# Patient Record
Sex: MALE | Race: WHITE | NOT HISPANIC OR LATINO | Employment: OTHER | URBAN - METROPOLITAN AREA
[De-identification: names, ages, dates, MRNs, and addresses within clinical notes are randomized per-mention and may not be internally consistent; named-entity substitution may affect disease eponyms.]

---

## 2018-02-08 ENCOUNTER — TELEPHONE (OUTPATIENT)
Dept: SURGERY | Facility: CLINIC | Age: 63
End: 2018-02-08

## 2018-02-08 DIAGNOSIS — Z80.0 FAMILY HISTORY OF CANCER OF GI TRACT: Primary | ICD-10-CM

## 2018-02-08 DIAGNOSIS — Z80.0 FAMILY HX OF COLON CANCER REQUIRING SCREENING COLONOSCOPY: Primary | ICD-10-CM

## 2018-02-08 NOTE — TELEPHONE ENCOUNTER
Spoke with patient in regards to ordering his colonoscopy.  I informed him that they will call him or he can call if he wants it done sooner.

## 2018-02-08 NOTE — TELEPHONE ENCOUNTER
----- Message from Kyara Mojica sent at 2/8/2018  9:29 AM CST -----  Contact: Self  Pt is calling because he received notice he needs to be scheduled for a Colonoscopy and would like to speak with Staff about it.    He can be reached at [phone].    Thank you.

## 2018-03-14 DIAGNOSIS — Z80.0 FAMILY HISTORY OF GI TRACT CANCER: Primary | ICD-10-CM

## 2018-03-14 RX ORDER — POLYETHYLENE GLYCOL 3350, SODIUM SULFATE ANHYDROUS, SODIUM BICARBONATE, SODIUM CHLORIDE, POTASSIUM CHLORIDE 236; 22.74; 6.74; 5.86; 2.97 G/4L; G/4L; G/4L; G/4L; G/4L
4 POWDER, FOR SOLUTION ORAL ONCE
Qty: 4000 ML | Refills: 0 | Status: SHIPPED | OUTPATIENT
Start: 2018-03-14 | End: 2018-03-14

## 2018-04-09 ENCOUNTER — HOSPITAL ENCOUNTER (OUTPATIENT)
Facility: HOSPITAL | Age: 63
Discharge: HOME | End: 2018-04-09
Attending: COLON & RECTAL SURGERY | Admitting: COLON & RECTAL SURGERY
Payer: COMMERCIAL

## 2018-04-09 ENCOUNTER — ANESTHESIA (OUTPATIENT)
Dept: ENDOSCOPY | Facility: HOSPITAL | Age: 63
End: 2018-04-09
Payer: COMMERCIAL

## 2018-04-09 ENCOUNTER — ANESTHESIA EVENT (OUTPATIENT)
Dept: ENDOSCOPY | Facility: HOSPITAL | Age: 63
End: 2018-04-09
Payer: COMMERCIAL

## 2018-04-09 VITALS
WEIGHT: 184 LBS | TEMPERATURE: 98 F | HEART RATE: 62 BPM | BODY MASS INDEX: 25.76 KG/M2 | OXYGEN SATURATION: 95 % | RESPIRATION RATE: 18 BRPM | SYSTOLIC BLOOD PRESSURE: 118 MMHG | HEIGHT: 71 IN | DIASTOLIC BLOOD PRESSURE: 75 MMHG

## 2018-04-09 DIAGNOSIS — Z12.11 SPECIAL SCREENING FOR MALIGNANT NEOPLASMS, COLON: ICD-10-CM

## 2018-04-09 PROCEDURE — 25000003 PHARM REV CODE 250: Performed by: COLON & RECTAL SURGERY

## 2018-04-09 PROCEDURE — S5010 5% DEXTROSE AND 0.45% SALINE: HCPCS | Performed by: COLON & RECTAL SURGERY

## 2018-04-09 PROCEDURE — 63600175 PHARM REV CODE 636 W HCPCS: Performed by: NURSE ANESTHETIST, CERTIFIED REGISTERED

## 2018-04-09 PROCEDURE — 37000008 HC ANESTHESIA 1ST 15 MINUTES: Performed by: COLON & RECTAL SURGERY

## 2018-04-09 PROCEDURE — D9220A PRA ANESTHESIA: Mod: 33,CRNA,, | Performed by: NURSE ANESTHETIST, CERTIFIED REGISTERED

## 2018-04-09 PROCEDURE — 45380 COLONOSCOPY AND BIOPSY: CPT | Performed by: COLON & RECTAL SURGERY

## 2018-04-09 PROCEDURE — 37000009 HC ANESTHESIA EA ADD 15 MINS: Performed by: COLON & RECTAL SURGERY

## 2018-04-09 PROCEDURE — 88305 TISSUE EXAM BY PATHOLOGIST: CPT

## 2018-04-09 PROCEDURE — 27201012 HC FORCEPS, HOT/COLD, DISP: Performed by: COLON & RECTAL SURGERY

## 2018-04-09 PROCEDURE — 45380 COLONOSCOPY AND BIOPSY: CPT | Mod: 33,,, | Performed by: COLON & RECTAL SURGERY

## 2018-04-09 PROCEDURE — D9220A PRA ANESTHESIA: Mod: 33,ANES,, | Performed by: ANESTHESIOLOGY

## 2018-04-09 PROCEDURE — 88305 TISSUE EXAM BY PATHOLOGIST: CPT | Mod: 26,,,

## 2018-04-09 RX ORDER — LIDOCAINE HCL/PF 100 MG/5ML
SYRINGE (ML) INTRAVENOUS
Status: DISCONTINUED | OUTPATIENT
Start: 2018-04-09 | End: 2018-04-09

## 2018-04-09 RX ORDER — DEXTROSE MONOHYDRATE AND SODIUM CHLORIDE 5; .45 G/100ML; G/100ML
INJECTION, SOLUTION INTRAVENOUS CONTINUOUS
Status: DISCONTINUED | OUTPATIENT
Start: 2018-04-09 | End: 2018-04-09 | Stop reason: HOSPADM

## 2018-04-09 RX ORDER — PROPOFOL 10 MG/ML
VIAL (ML) INTRAVENOUS
Status: DISCONTINUED | OUTPATIENT
Start: 2018-04-09 | End: 2018-04-09

## 2018-04-09 RX ADMIN — PROPOFOL 100 MG: 10 INJECTION, EMULSION INTRAVENOUS at 09:04

## 2018-04-09 RX ADMIN — PROPOFOL 50 MG: 10 INJECTION, EMULSION INTRAVENOUS at 10:04

## 2018-04-09 RX ADMIN — PROPOFOL 20 MG: 10 INJECTION, EMULSION INTRAVENOUS at 09:04

## 2018-04-09 RX ADMIN — PROPOFOL 30 MG: 10 INJECTION, EMULSION INTRAVENOUS at 10:04

## 2018-04-09 RX ADMIN — DEXTROSE AND SODIUM CHLORIDE: 5; .45 INJECTION, SOLUTION INTRAVENOUS at 09:04

## 2018-04-09 RX ADMIN — PROPOFOL 50 MG: 10 INJECTION, EMULSION INTRAVENOUS at 09:04

## 2018-04-09 RX ADMIN — PROPOFOL 30 MG: 10 INJECTION, EMULSION INTRAVENOUS at 09:04

## 2018-04-09 RX ADMIN — LIDOCAINE HYDROCHLORIDE 100 MG: 20 INJECTION, SOLUTION INTRAVENOUS at 09:04

## 2018-04-09 NOTE — ANESTHESIA POSTPROCEDURE EVALUATION
"Anesthesia Post Evaluation    Patient: Matthew Pisano    Procedure(s) Performed: Procedure(s) (LRB):  COLONOSCOPY (N/A)    Final Anesthesia Type: general  Patient location during evaluation: PACU  Patient participation: Yes- Able to Participate  Level of consciousness: awake and alert  Post-procedure vital signs: reviewed and stable  Pain management: adequate  Airway patency: patent  PONV status at discharge: No PONV  Anesthetic complications: no      Cardiovascular status: blood pressure returned to baseline  Respiratory status: unassisted, room air and spontaneous ventilation  Hydration status: euvolemic  Follow-up not needed.        Visit Vitals  /75   Pulse 62   Temp 36.8 °C (98.2 °F)   Resp 18   Ht 5' 11" (1.803 m)   Wt 83.5 kg (184 lb)   SpO2 95%   BMI 25.66 kg/m²       Pain/John Score: Pain Assessment Performed: Yes (4/9/2018 10:42 AM)  Presence of Pain: denies (4/9/2018 10:42 AM)  John Score: 10 (4/9/2018 10:42 AM)      "

## 2018-04-09 NOTE — ANESTHESIA PREPROCEDURE EVALUATION
04/09/2018     Matthew Pisano is a 62 y.o., male here for colonoscopy.    History reviewed. No pertinent surgical history.    History reviewed. No pertinent surgical history.      Anesthesia Evaluation    I have reviewed the Patient Summary Reports.     I have reviewed the Medications.     Review of Systems  Anesthesia Hx:  No problems with previous Anesthesia  History of prior surgery of interest to airway management or planning: Denies Family Hx of Anesthesia complications.   Denies Personal Hx of Anesthesia complications.   Cardiovascular:  Cardiovascular Normal Exercise tolerance: good  Denies MI.    Denies Angina.    Pulmonary:  Pulmonary Normal  Denies Asthma.  Denies Recent URI.    Neurological:  Neurology Normal        Physical Exam  General:  Well nourished    Airway/Jaw/Neck:  Airway Findings: Mouth Opening: Normal Tongue: Normal  General Airway Assessment: Adult  Mallampati: II  TM Distance: Normal, at least 6 cm      Dental:  Dental Findings: In tact   Chest/Lungs:  Chest/Lungs Findings: Normal Respiratory Rate     Heart/Vascular:  Heart Findings: Rate: Normal        Mental Status:  Mental Status Findings:  Alert and Oriented         Anesthesia Plan  Type of Anesthesia, risks & benefits discussed:  Anesthesia Type:  general  Patient's Preference:   Intra-op Monitoring Plan: standard ASA monitors  Intra-op Monitoring Plan Comments:   Post Op Pain Control Plan: multimodal analgesia, IV/PO Opioids PRN and per primary service following discharge from PACU  Post Op Pain Control Plan Comments:   Induction:   IV  Beta Blocker:  Patient is not currently on a Beta-Blocker (No further documentation required).       Informed Consent: Patient understands risks and agrees with Anesthesia plan.  Questions answered. Anesthesia consent signed with patient.  ASA Score: 1     Day of Surgery Review of History &  Physical:    H&P update referred to the surgeon.         Ready For Surgery From Anesthesia Perspective.

## 2018-04-09 NOTE — DISCHARGE INSTRUCTIONS
Colonoscopy     A camera attached to a flexible tube with a viewing lens is used to take video pictures.     Colonoscopy is a test to view the inside of your lower digestive tract (colon and rectum). Sometimes it can show the last part of the small intestine (ileum). During the test, small pieces of tissue may be removed for testing. This is called a biopsy. Small growths, such as polyps, may also be removed.   Why is colonoscopy done?  The test is done to help look for colon cancer. And it can help find the source of abdominal pain, bleeding, and changes in bowel habits. It may be needed once a year, depending on factors such as your:  · Age  · Health history  · Family health history  · Symptoms  · Results from any prior colonoscopy  Risks and possible complications  These include:  · Bleeding               · A puncture or tear in the colon   · Risks of anesthesia  · A cancer lesion not being seen  Getting ready   To prepare for the test:  · Talk with your healthcare provider about the risks of the test (see below). Also ask your healthcare provider about alternatives to the test.  · Tell your healthcare provider about any medicines you take. Also tell him or her about any health conditions you may have.  · Make sure your rectum and colon are empty for the test. Follow the diet and bowel prep instructions exactly. If you dont, the test may need to be rescheduled.  · Plan for a friend or family member to drive you home after the test.     Colonoscopy provides an inside view of the entire colon.     You may discuss the results with your doctor right away or at a future visit.  During the test   The test is usually done in the hospital on an outpatient basis. This means you go home the same day. The procedure takes about 30 minutes. During that time:  · You are given relaxing (sedating) medicine through an IV line. You may be drowsy, or fully asleep.  · The healthcare provider will first give you a physical exam to  check for anal and rectal problems.  · Then the anus is lubricated and the scope inserted.  · If you are awake, you may have a feeling similar to needing to have a bowel movement. You may also feel pressure as air is pumped into the colon. Its OK to pass gas during the procedure.  · Biopsy, polyp removal, or other treatments may be done during the test.  After the test   You may have gas right after the test. It can help to try to pass it to help prevent later bloating. Your healthcare provider may discuss the results with you right away. Or you may need to schedule a follow-up visit to talk about the results. After the test, you can go back to your normal eating and other activities. You may be tired from the sedation and need to rest for a few hours.  Date Last Reviewed: 11/1/2016 © 2000-2017 [employer]. [address] [place] [address], [place] [address]. All rights reserved. This information is not intended as a substitute for professional medical care. Always follow your healthcare professional's instructions.

## 2018-04-09 NOTE — PLAN OF CARE
Discharge instructions given to patient. Verbalized understanding, states I'm ready for discharge.

## 2018-04-09 NOTE — PROVATION PATIENT INSTRUCTIONS
Discharge Summary/Instructions after an Endoscopic Procedure  Patient Name: Matthew Pisano  Patient MRN: [id number]  Patient YOB: 1955 Monday, April 09, 2018  Tone Emmanuel MD  RESTRICTIONS:  During your procedure today, you received medications for sedation.  These   medications may affect your judgment, balance and coordination.  Therefore,   for 24 hours, you have the following restrictions:   - DO NOT drive a car, operate machinery, make legal/financial decisions,   sign important papers or drink alcohol.    ACTIVITY:  The following day: return to full activity including work, except no heavy   lifting, straining or running for 3 days if polyps were removed.  DIET:  Eat and drink normally unless instructed otherwise.     TREATMENT FOR COMMON SIDE EFFECTS:  - Mild abdominal pain, nausea, belching, bloating or excessive gas:  rest,   eat lightly and use a heating pad.  - Sore Throat: treat with throat lozenges and/or gargle with warm salt   water.  - Because air was used during the procedure, expelling large amounts of air   from your rectum or belching is normal.  - If a bowel prep was taken, you may not have a bowel movement for 1-3 days.    This is normal.  SYMPTOMS TO WATCH FOR AND REPORT TO YOUR PHYSICIAN:  1. Abdominal pain or bloating, other than gas cramps.  2. Chest pain.  3. Back pain.  4. Signs of infection such as: chills or fever occurring within 24 hours   after the procedure.  5. Rectal bleeding, which would show as bright red, maroon, or black stools.   (A tablespoon of blood from the rectum is not serious, especially if   hemorrhoids are present.)  6. Vomiting.  7. Weakness or dizziness.  GO DIRECTLY TO THE NEAREST EMERGENCY ROOM IF YOU HAVE ANY OF THE FOLLOWING:      Difficulty breathing              Chills and/or fever over 101 F   Persistent vomiting and/or vomiting blood   Severe abdominal pain   Severe chest pain   Black, tarry stools   Bleeding- more than one tablespoon   Any  other symptom or condition that you feel may need urgent attention  Your doctor recommends these additional instructions:  If any biopsies were taken, your doctors clinic will contact you in 1 to 2   weeks with any results.  - Discharge patient to home.   - Repeat colonoscopy in 5-10 years for surveillance based on pathology   results.  For questions, problems or results please call your physician - Tone Emmanuel MD at Work:  [phone].  [facility], EMERGENCY ROOM PHONE NUMBER: [phone]  IF A COMPLICATION OR EMERGENCY SITUATION ARISES AND YOU ARE UNABLE TO REACH   YOUR PHYSICIAN - GO DIRECTLY TO THE EMERGENCY ROOM.  Tone Emmanuel MD  4/9/2018 10:08:55 AM  This report has been verified and signed electronically.

## 2018-04-09 NOTE — TRANSFER OF CARE
"Anesthesia Transfer of Care Note    Patient: Matthew Pisano    Procedure(s) Performed: Procedure(s) (LRB):  COLONOSCOPY (N/A)    Patient location: PACU    Anesthesia Type: general    Transport from OR: Transported from OR on room air with adequate spontaneous ventilation    Post pain: adequate analgesia    Post assessment: no apparent anesthetic complications and tolerated procedure well    Post vital signs: stable    Level of consciousness: sedated    Nausea/Vomiting: no nausea/vomiting    Complications: none    Transfer of care protocol was followed      Last vitals:   Visit Vitals  BP (!) 145/75 (BP Location: Left arm, Patient Position: Lying)   Pulse (!) 59   Temp 36.9 °C (98.4 °F) (Temporal)   Resp 14   Ht 5' 11" (1.803 m)   Wt 83.5 kg (184 lb)   SpO2 98%   BMI 25.66 kg/m²     "

## 2018-04-09 NOTE — H&P
Endoscopy H&P    Procedure : Colonoscopy      asymptomatic screening exam and family history of colon cancer      History reviewed. No pertinent past medical history.  Sedation Problems: NO  Family History   Problem Relation Age of Onset    Bladder Cancer Father     Pancreatic cancer Brother     Melanoma Neg Hx     Psoriasis Neg Hx     Lupus Neg Hx      Fam Hx of Sedation Problems: NO  Social History     Social History    Marital status: Single     Spouse name: N/A    Number of children: N/A    Years of education: N/A     Occupational History    Restaurant Self     Social History Main Topics    Smoking status: Never Smoker    Smokeless tobacco: Never Used    Alcohol use Yes      Comment: Social    Drug use: No    Sexual activity: Not on file     Other Topics Concern    Not on file     Social History Narrative    No narrative on file       Review of Systems - Negative   Respiratory ROS: no cough, shortness of breath, or wheezing  Cardiovascular ROS: no chest pain or dyspnea on exertion  Gastrointestinal ROS: no abdominal pain, change in bowel habits, or black or bloody stools  Musculoskeletal ROS: negative  Neurological ROS: no TIA or stroke symptoms        Physical Exam:  General: no distress  Head: normocephalic  Airway:  normal oropharynx, airway normal  Neck: supple, symmetrical, trachea midline  Lungs:  clear to auscultation bilaterally and normal respiratory effort  Heart: regular rate and rhythm, S1, S2 normal, no murmur, rub or gallop  Abdomen: soft, non-tender non-distented; bowel sounds normal; no masses,  no organomegaly  Extremities: no cyanosis or edema, or clubbing       Deep Sedation: Mallampati Score per anesthesia     SedationPlan :Choice     ASA : II

## 2018-04-16 ENCOUNTER — TELEPHONE (OUTPATIENT)
Dept: ENDOSCOPY | Facility: HOSPITAL | Age: 63
End: 2018-04-16

## 2019-06-09 ENCOUNTER — OFFICE VISIT (OUTPATIENT)
Dept: URGENT CARE | Facility: CLINIC | Age: 64
End: 2019-06-09
Payer: COMMERCIAL

## 2019-06-09 VITALS
TEMPERATURE: 97 F | OXYGEN SATURATION: 99 % | SYSTOLIC BLOOD PRESSURE: 131 MMHG | HEIGHT: 71 IN | DIASTOLIC BLOOD PRESSURE: 81 MMHG | HEART RATE: 56 BPM | WEIGHT: 184 LBS | BODY MASS INDEX: 25.76 KG/M2

## 2019-06-09 DIAGNOSIS — R42 VERTIGO: ICD-10-CM

## 2019-06-09 DIAGNOSIS — R42 DIZZINESS: Primary | ICD-10-CM

## 2019-06-09 LAB
GLUCOSE SERPL-MCNC: 95 MG/DL (ref 70–110)
POC ANION GAP: 17 MMOL/L (ref 10–20)
POC BUN: 19 MMOL/L (ref 8–26)
POC CHLORIDE: 103 MMOL/L (ref 98–109)
POC CREATININE: 0.9 MG/DL (ref 0.6–1.3)
POC HEMATOCRIT: 46 %PCV (ref 42–52)
POC HEMOGLOBIN: 15.6 G/DL (ref 13.5–18)
POC ICA: 1.2 MMOL/L (ref 1.12–1.32)
POC POTASSIUM: 4.1 MMOL/L (ref 3.5–4.9)
POC SODIUM: 143 MMOL/L (ref 138–146)
POC TCO2: 28 MMOL/L (ref 24–29)

## 2019-06-09 PROCEDURE — 80047 POCT CHEMISTRY PANEL: ICD-10-PCS | Mod: QW,S$GLB,, | Performed by: PHYSICIAN ASSISTANT

## 2019-06-09 PROCEDURE — 3075F PR MOST RECENT SYSTOLIC BLOOD PRESS GE 130-139MM HG: ICD-10-PCS | Mod: CPTII,S$GLB,, | Performed by: PHYSICIAN ASSISTANT

## 2019-06-09 PROCEDURE — 99203 OFFICE O/P NEW LOW 30 MIN: CPT | Mod: S$GLB,,, | Performed by: PHYSICIAN ASSISTANT

## 2019-06-09 PROCEDURE — 99203 PR OFFICE/OUTPT VISIT, NEW, LEVL III, 30-44 MIN: ICD-10-PCS | Mod: S$GLB,,, | Performed by: PHYSICIAN ASSISTANT

## 2019-06-09 PROCEDURE — 3079F PR MOST RECENT DIASTOLIC BLOOD PRESSURE 80-89 MM HG: ICD-10-PCS | Mod: CPTII,S$GLB,, | Performed by: PHYSICIAN ASSISTANT

## 2019-06-09 PROCEDURE — 80047 BASIC METABLC PNL IONIZED CA: CPT | Mod: QW,S$GLB,, | Performed by: PHYSICIAN ASSISTANT

## 2019-06-09 PROCEDURE — 3008F BODY MASS INDEX DOCD: CPT | Mod: CPTII,S$GLB,, | Performed by: PHYSICIAN ASSISTANT

## 2019-06-09 PROCEDURE — 3079F DIAST BP 80-89 MM HG: CPT | Mod: CPTII,S$GLB,, | Performed by: PHYSICIAN ASSISTANT

## 2019-06-09 PROCEDURE — 3075F SYST BP GE 130 - 139MM HG: CPT | Mod: CPTII,S$GLB,, | Performed by: PHYSICIAN ASSISTANT

## 2019-06-09 PROCEDURE — 3008F PR BODY MASS INDEX (BMI) DOCUMENTED: ICD-10-PCS | Mod: CPTII,S$GLB,, | Performed by: PHYSICIAN ASSISTANT

## 2019-06-09 RX ORDER — MECLIZINE HYDROCHLORIDE 25 MG/1
25 TABLET ORAL 3 TIMES DAILY PRN
Qty: 20 TABLET | Refills: 0 | Status: SHIPPED | OUTPATIENT
Start: 2019-06-09 | End: 2021-01-26

## 2019-06-09 RX ORDER — ONDANSETRON 4 MG/1
4 TABLET, FILM COATED ORAL EVERY 8 HOURS PRN
Qty: 30 TABLET | Refills: 0 | Status: SHIPPED | OUTPATIENT
Start: 2019-06-09 | End: 2021-01-26

## 2019-06-09 RX ORDER — MECLIZINE HYDROCHLORIDE 25 MG/1
25 TABLET ORAL
Status: COMPLETED | OUTPATIENT
Start: 2019-06-09 | End: 2019-06-09

## 2019-06-09 RX ADMIN — MECLIZINE HYDROCHLORIDE 25 MG: 25 TABLET ORAL at 12:06

## 2019-06-09 NOTE — PROGRESS NOTES
"Subjective:       Patient ID: Matthew Pisano is a 63 y.o. male.    Vitals:  height is 5' 11" (1.803 m) and weight is 83.5 kg (184 lb). His oral temperature is 96.8 °F (36 °C). His blood pressure is 131/81 and his pulse is 56 (abnormal). His oxygen saturation is 99%.     Chief Complaint: No chief complaint on file.    Pt is 62 y/o male who presents with dizziness. Symptoms began yesterday. Pt reports feeling "hungover" all day. He feels like the room is spinning. Symptoms are fairly constant. He also has associated nausea. He has had vertigo in the past. He does report symptoms sometimes get worse with positional changes. He denies cp/sob, headache, numbness/tingling, weakness, vision changes, or trauma.     Dizziness:   Chronicity:  New  Onset:  Yesterday  Progression since onset:  Unchanged  Frequency:  Constantly  Pain Scale:  4/10  Severity:  Moderate  Duration:  Off/on all day  Dizziness characteristics:  Off-balance, spacial disorientation and trouble focusing eyes  Frequency of Spells:  Hourly   Associated symptoms: nausea and light-headedness.no fever, no headaches, no vomiting and no chest pain.  Aggravated by:  Nothing  Treatments tried:  Nothing      Constitution: Negative for chills, fatigue, fever and generalized weakness.   HENT: Negative for congestion and sore throat.    Neck: Negative for painful lymph nodes.   Cardiovascular: Negative for chest pain and leg swelling.   Eyes: Negative for double vision and blurred vision.   Respiratory: Negative for cough and shortness of breath.    Gastrointestinal: Positive for nausea. Negative for vomiting and diarrhea.   Genitourinary: Negative for dysuria, frequency and urgency.   Musculoskeletal: Negative for joint pain, joint swelling, muscle cramps and muscle ache.   Skin: Negative for color change, pale and rash.   Allergic/Immunologic: Negative for seasonal allergies.   Neurological: Positive for dizziness, history of vertigo and light-headedness. Negative " for passing out, loss of balance, headaches, numbness and tingling.   Hematologic/Lymphatic: Negative for swollen lymph nodes, easy bruising/bleeding and history of blood clots. Does not bruise/bleed easily.   Psychiatric/Behavioral: Negative for nervous/anxious, sleep disturbance and depression. The patient is not nervous/anxious.        Objective:      Physical Exam   Constitutional: He is oriented to person, place, and time. He appears well-developed and well-nourished. He is cooperative.  Non-toxic appearance. He does not appear ill. No distress.   HENT:   Head: Normocephalic and atraumatic.   Right Ear: Hearing, tympanic membrane, external ear and ear canal normal.   Left Ear: Hearing, tympanic membrane, external ear and ear canal normal.   Nose: Nose normal. No mucosal edema, rhinorrhea or nasal deformity. No epistaxis. Right sinus exhibits no maxillary sinus tenderness and no frontal sinus tenderness. Left sinus exhibits no maxillary sinus tenderness and no frontal sinus tenderness.   Mouth/Throat: Uvula is midline, oropharynx is clear and moist and mucous membranes are normal. No trismus in the jaw. Normal dentition. No uvula swelling. No posterior oropharyngeal erythema.   Eyes: Conjunctivae and lids are normal. No scleral icterus.   Sclera clear bilat   Neck: Trachea normal, full passive range of motion without pain and phonation normal. Neck supple.   Cardiovascular: Normal rate, regular rhythm, normal heart sounds, intact distal pulses and normal pulses.   Pulmonary/Chest: Effort normal and breath sounds normal. No respiratory distress.   Abdominal: Soft. Normal appearance and bowel sounds are normal. He exhibits no distension. There is no tenderness.   Musculoskeletal: Normal range of motion. He exhibits no edema or deformity.   Neurological: He is alert and oriented to person, place, and time. He has normal strength. No cranial nerve deficit or sensory deficit. He exhibits normal muscle tone.  Coordination and gait normal. GCS eye subscore is 4. GCS verbal subscore is 5. GCS motor subscore is 6.   No pronator drift or dysmetria. + Romberg.   Skin: Skin is warm, dry and intact. He is not diaphoretic. No pallor.   Psychiatric: He has a normal mood and affect. His speech is normal and behavior is normal. Judgment and thought content normal. Cognition and memory are normal.   Nursing note and vitals reviewed.      Results for orders placed or performed in visit on 06/09/19   POCT Chemistry Panel   Result Value Ref Range    POC Sodium 143 138 - 146 MMOL/L    POC Potassium 4.1 3.5 - 4.9 MMOL/L    POC Chloride 103 98 - 109 MMOL/L    POC BUN 19 8 - 26 MMOL/L    POC Glucose 95 70 - 110 MG/DL    POC Creatinine 0.9 0.6 - 1.3 mg/dL    POC iCA 1.20 1.12 - 1.32 MMOL/L    POC TCO2 28 24 - 29 MMOL/L    POC Hematocrit 46 42 - 52 %PCV    POC Hemoglobin 15.6 13.5 - 18 g/dL    POC Anion Gap 17 10.0 - 20 MMOL/L       Assessment:       1. Dizziness    2. Vertigo        Plan:         Dizziness  -     POCT Chemistry Panel  -     meclizine (ANTIVERT) 25 mg tablet; Take 1 tablet (25 mg total) by mouth 3 (three) times daily as needed for Dizziness.  Dispense: 20 tablet; Refill: 0  -     meclizine tablet 25 mg  -     ondansetron (ZOFRAN) 4 MG tablet; Take 1 tablet (4 mg total) by mouth every 8 (eight) hours as needed for Nausea.  Dispense: 30 tablet; Refill: 0    Vertigo  -     meclizine (ANTIVERT) 25 mg tablet; Take 1 tablet (25 mg total) by mouth 3 (three) times daily as needed for Dizziness.  Dispense: 20 tablet; Refill: 0  -     meclizine tablet 25 mg  -     ondansetron (ZOFRAN) 4 MG tablet; Take 1 tablet (4 mg total) by mouth every 8 (eight) hours as needed for Nausea.  Dispense: 30 tablet; Refill: 0          Orthostatic Low Blood Pressure (Hypotension)  A blood pressure reading is made up of 2 numbers There is a top number over a bottom number. The top number is the systolic pressure. The bottom number is the diastolic  pressure. A normal blood pressure is a systolic pressure less than 120 over a diastolic pressure less than 80. Low blood pressure (hypotension) is a blood pressure that is less than what is normal for you.  Orthostatic hypotension is a type of low blood pressure that occurs only when you change position from lying to standing. It can cause dizziness, lightheadedness, or fainting.  Some medicines can cause orthostatic hypotension. These include:  · High blood pressure medicines  · Water pills (diuretics)  · Some heart medicines  · Some antidepressants  · Pain, anxiety, sedative, and sleeping medicines  Other causes include:  · Dehydration from vomiting, diarrhea, or not getting enough fluids  · Severe infection  · High fever  · Blood loss, such as bleeding from the stomach or intestines  · Neurological diseases that impair the autonomic nervous system  Treatment will depend on what is causing your low blood pressure.  Home care  Follow these guidelines when caring for yourself at home:  · Rest until your symptoms get better.  · Change positions slowly from lying to standing. When getting out of bed, sit on the side of the bed with your legs down for at least 30 seconds before standing. This gives your body time to adjust to the position change.  · Follow the treatment plan described by your healthcare provider.  Follow-up care  Follow up with your healthcare provider, or as advised.  When to seek medical advice  Call your healthcare provider right away if any of these occur:  · Dizziness, lightheadedness, or fainting  · Black or red color in your stools or vomit  · Diarrhea or vomiting that doesnt go away  · You arent able to eat or drink  · Fever of 100.4°F (38°C) or higher, or as directed by your healthcare provider  · Burning when you urinate  · Foul-smelling urine  Date Last Reviewed: 12/1/2016  © 6104-4320 [employer]. [address] [place] [address], [place] [address]. All rights reserved. This  information is not intended as a substitute for professional medical care. Always follow your healthcare professional's instructions.        Vertigo (Unknown Cause)    In addition to helping with hearing, the inner ear is part of the balance center of your body. Problems with the inner ear can a false feeling of motion. This is called vertigo. Often, it feels as if you or the room is spinning. A vertigo attack may cause sudden nausea, vomiting and heavy sweating. Severe vertigo causes a loss of balance and can cause you to fall. During vertigo, small head movements and changes in body position will often make the symptoms worse. You may also have ringing in the ears called tinnitus.  An episode of vertigo may last seconds, minutes or hours. Once you are over the first episode, it may never come back. However, symptoms may return off and on.  The cause of your vertigo is not yet known. Possible causes of vertigo include:  · Inflammation of the inner ear  · Disease of the nerves to the inner ear  · Movement of calcium particles in the inner ear  · Poor blood flow to the balance centers of the brain  · Migraine headaches  Home care  · If symptoms are severe, rest quietly in bed. Change positions very slowly. There is usually one position that will feel best, such as lying on one side or lying on your back with your head slightly raised on pillows.  · Do not drive a car or work with dangerous machinery until symptoms have been gone for at least one week.  · Take medicine as prescribed to relieve your symptoms. Unless another medicine was prescribed for symptoms of nausea, vomiting, and dizziness, you may use over-the-counter motion sickness pills. Ask your pharmacist for suggestions.  Follow-up care  Follow up with your healthcare provider or as directed. If you are referred to a specialist or for testing, make the appointment promptly.  When to seek medical advice  Call your healthcare provider if any of the following  occur:  · Fever of 100.4°F (38ºC) or higher, or as directed by your healthcare provider  · Vertigo worsens or is not controlled by prescribed medicine   · Repeated vomiting not relieved by prescribed medicine   · Severe headache  · Confusion  · Weakness of an arm or leg or one side of the face  · Difficulty with speech or vision  · Loss of consciousness   · Seizure  Date Last Reviewed: 8/16/2015  © 0181-5694 [employer]. [address] [place] [address], [place] [address]. All rights reserved. This information is not intended as a substitute for professional medical care. Always follow your healthcare professional's instructions.      Please follow up with your Primary care provider within 2-5 days if your signs and symptoms have not resolved or worsen.     If your condition worsens or fails to improve we recommend that you receive another evaluation at the emergency room immediately or contact your primary medical clinic to discuss your concerns.   You must understand that you have received an Urgent Care treatment only and that you may be released before all of your medical problems are known or treated. You, the patient, will arrange for follow up care as instructed.     RED FLAGS/WARNING SYMPTOMS DISCUSSED WITH PATIENT THAT WOULD WARRANT EMERGENT MEDICAL ATTENTION. PATIENT VERBALIZED UNDERSTANDING.

## 2019-06-09 NOTE — PATIENT INSTRUCTIONS
Orthostatic Low Blood Pressure (Hypotension)  A blood pressure reading is made up of 2 numbers There is a top number over a bottom number. The top number is the systolic pressure. The bottom number is the diastolic pressure. A normal blood pressure is a systolic pressure less than 120 over a diastolic pressure less than 80. Low blood pressure (hypotension) is a blood pressure that is less than what is normal for you.  Orthostatic hypotension is a type of low blood pressure that occurs only when you change position from lying to standing. It can cause dizziness, lightheadedness, or fainting.  Some medicines can cause orthostatic hypotension. These include:  · High blood pressure medicines  · Water pills (diuretics)  · Some heart medicines  · Some antidepressants  · Pain, anxiety, sedative, and sleeping medicines  Other causes include:  · Dehydration from vomiting, diarrhea, or not getting enough fluids  · Severe infection  · High fever  · Blood loss, such as bleeding from the stomach or intestines  · Neurological diseases that impair the autonomic nervous system  Treatment will depend on what is causing your low blood pressure.  Home care  Follow these guidelines when caring for yourself at home:  · Rest until your symptoms get better.  · Change positions slowly from lying to standing. When getting out of bed, sit on the side of the bed with your legs down for at least 30 seconds before standing. This gives your body time to adjust to the position change.  · Follow the treatment plan described by your healthcare provider.  Follow-up care  Follow up with your healthcare provider, or as advised.  When to seek medical advice  Call your healthcare provider right away if any of these occur:  · Dizziness, lightheadedness, or fainting  · Black or red color in your stools or vomit  · Diarrhea or vomiting that doesnt go away  · You arent able to eat or drink  · Fever of 100.4°F (38°C) or higher, or as directed by your  healthcare provider  · Burning when you urinate  · Foul-smelling urine  Date Last Reviewed: 12/1/2016  © 0072-7855 [employer]. [address] [place] [address], [place] [address]. All rights reserved. This information is not intended as a substitute for professional medical care. Always follow your healthcare professional's instructions.        Vertigo (Unknown Cause)    In addition to helping with hearing, the inner ear is part of the balance center of your body. Problems with the inner ear can a false feeling of motion. This is called vertigo. Often, it feels as if you or the room is spinning. A vertigo attack may cause sudden nausea, vomiting and heavy sweating. Severe vertigo causes a loss of balance and can cause you to fall. During vertigo, small head movements and changes in body position will often make the symptoms worse. You may also have ringing in the ears called tinnitus.  An episode of vertigo may last seconds, minutes or hours. Once you are over the first episode, it may never come back. However, symptoms may return off and on.  The cause of your vertigo is not yet known. Possible causes of vertigo include:  · Inflammation of the inner ear  · Disease of the nerves to the inner ear  · Movement of calcium particles in the inner ear  · Poor blood flow to the balance centers of the brain  · Migraine headaches  Home care  · If symptoms are severe, rest quietly in bed. Change positions very slowly. There is usually one position that will feel best, such as lying on one side or lying on your back with your head slightly raised on pillows.  · Do not drive a car or work with dangerous machinery until symptoms have been gone for at least one week.  · Take medicine as prescribed to relieve your symptoms. Unless another medicine was prescribed for symptoms of nausea, vomiting, and dizziness, you may use over-the-counter motion sickness pills. Ask your pharmacist for suggestions.  Follow-up care  Follow up  with your healthcare provider or as directed. If you are referred to a specialist or for testing, make the appointment promptly.  When to seek medical advice  Call your healthcare provider if any of the following occur:  · Fever of 100.4°F (38ºC) or higher, or as directed by your healthcare provider  · Vertigo worsens or is not controlled by prescribed medicine   · Repeated vomiting not relieved by prescribed medicine   · Severe headache  · Confusion  · Weakness of an arm or leg or one side of the face  · Difficulty with speech or vision  · Loss of consciousness   · Seizure  Date Last Reviewed: 8/16/2015  © 6390-5788 [employer]. [address] [place] [address]. All rights reserved. This information is not intended as a substitute for professional medical care. Always follow your healthcare professional's instructions.      Please follow up with your Primary care provider within 2-5 days if your signs and symptoms have not resolved or worsen.     If your condition worsens or fails to improve we recommend that you receive another evaluation at the emergency room immediately or contact your primary medical clinic to discuss your concerns.   You must understand that you have received an Urgent Care treatment only and that you may be released before all of your medical problems are known or treated. You, the patient, will arrange for follow up care as instructed.     RED FLAGS/WARNING SYMPTOMS DISCUSSED WITH PATIENT THAT WOULD WARRANT EMERGENT MEDICAL ATTENTION. PATIENT VERBALIZED UNDERSTANDING.

## 2019-08-05 ENCOUNTER — NURSE TRIAGE (OUTPATIENT)
Dept: ADMINISTRATIVE | Facility: CLINIC | Age: 64
End: 2019-08-05

## 2019-08-05 NOTE — TELEPHONE ENCOUNTER
Reason for Disposition   Dizziness not present now, but is a chronic symptom (recurrent or ongoing AND lasting > 4 weeks)    Protocols used: DIZZINESS-A-OH    Matthew has not been seen in [facility] primary care since 2015.  He called to say he is a runner, runs on the treadmill 3-5 times a week without any difficulty and with no sob, but when going up stairs he becomes a little short of breath.  No sob with rest, no chest pain, no difficulty breathing.  He states he also has vertigo, and has been dizzy lately, but this is not new.  He was seen [place] [facility] [place] for vertigo, prescribed meclizine, and was told to follow up with PCP within 3-5 days. He also states he has had vertigo multiple times in the past.  Appointment made with Dianne Ulrich MD 08/08/19 for evaluation.  He requested an appointment with Dr Leticia Andersen, but has not seen her since 2015 and she was not available.  Encouraged him to call back for worsening symptoms, new concerns.  Message to Nicolasa Ulrich and Nathaly.  Please contact him directly with any additional care advice.

## 2019-08-05 NOTE — TELEPHONE ENCOUNTER
Spoke with patient and he agreed to come in on 8/7/19 at 1:40 pm per Dr. Andersen. Asked Tiffanie Prince to schedule.

## 2019-08-05 NOTE — TELEPHONE ENCOUNTER
Please ask if he can come in 8-7-19 at 1:40? Please also let him know if he has any chest pain, shortness of breath or palpitations before then, he needs to go to the ER

## 2019-08-07 ENCOUNTER — HOSPITAL ENCOUNTER (OUTPATIENT)
Dept: RADIOLOGY | Facility: HOSPITAL | Age: 64
Discharge: HOME | End: 2019-08-07
Attending: INTERNAL MEDICINE
Payer: COMMERCIAL

## 2019-08-07 ENCOUNTER — CLINICAL SUPPORT (OUTPATIENT)
Dept: CARDIOLOGY | Facility: HOSPITAL | Age: 64
End: 2019-08-07
Attending: INTERNAL MEDICINE
Payer: COMMERCIAL

## 2019-08-07 ENCOUNTER — OFFICE VISIT (OUTPATIENT)
Dept: INTERNAL MEDICINE | Facility: CLINIC | Age: 64
End: 2019-08-07
Payer: COMMERCIAL

## 2019-08-07 DIAGNOSIS — R06.09 DOE (DYSPNEA ON EXERTION): ICD-10-CM

## 2019-08-07 DIAGNOSIS — R00.2 PALPITATIONS: ICD-10-CM

## 2019-08-07 DIAGNOSIS — R06.09 DOE (DYSPNEA ON EXERTION): Primary | ICD-10-CM

## 2019-08-07 DIAGNOSIS — E78.5 HYPERLIPIDEMIA, UNSPECIFIED HYPERLIPIDEMIA TYPE: ICD-10-CM

## 2019-08-07 PROCEDURE — 99999 PR PBB SHADOW E&M-EST. PATIENT-LVL V: ICD-10-PCS | Mod: PBBFAC,,, | Performed by: INTERNAL MEDICINE

## 2019-08-07 PROCEDURE — 71046 XR CHEST PA AND LATERAL: ICD-10-PCS | Mod: 26,,, | Performed by: RADIOLOGY

## 2019-08-07 PROCEDURE — 3074F PR MOST RECENT SYSTOLIC BLOOD PRESSURE < 130 MM HG: ICD-10-PCS | Mod: CPTII,S$GLB,, | Performed by: INTERNAL MEDICINE

## 2019-08-07 PROCEDURE — 99203 OFFICE O/P NEW LOW 30 MIN: CPT | Mod: S$GLB,,, | Performed by: INTERNAL MEDICINE

## 2019-08-07 PROCEDURE — 71046 X-RAY EXAM CHEST 2 VIEWS: CPT | Mod: 26,,, | Performed by: RADIOLOGY

## 2019-08-07 PROCEDURE — 3008F BODY MASS INDEX DOCD: CPT | Mod: CPTII,S$GLB,, | Performed by: INTERNAL MEDICINE

## 2019-08-07 PROCEDURE — 93227 HOLTER MONITOR - 24 HOUR (CUPID ONLY): ICD-10-PCS | Mod: ,,, | Performed by: INTERNAL MEDICINE

## 2019-08-07 PROCEDURE — 3078F PR MOST RECENT DIASTOLIC BLOOD PRESSURE < 80 MM HG: ICD-10-PCS | Mod: CPTII,S$GLB,, | Performed by: INTERNAL MEDICINE

## 2019-08-07 PROCEDURE — 93225 XTRNL ECG REC<48 HRS REC: CPT

## 2019-08-07 PROCEDURE — 3008F PR BODY MASS INDEX (BMI) DOCUMENTED: ICD-10-PCS | Mod: CPTII,S$GLB,, | Performed by: INTERNAL MEDICINE

## 2019-08-07 PROCEDURE — 3074F SYST BP LT 130 MM HG: CPT | Mod: CPTII,S$GLB,, | Performed by: INTERNAL MEDICINE

## 2019-08-07 PROCEDURE — 93227 XTRNL ECG REC<48 HR R&I: CPT | Mod: ,,, | Performed by: INTERNAL MEDICINE

## 2019-08-07 PROCEDURE — 99203 PR OFFICE/OUTPT VISIT, NEW, LEVL III, 30-44 MIN: ICD-10-PCS | Mod: S$GLB,,, | Performed by: INTERNAL MEDICINE

## 2019-08-07 PROCEDURE — 71046 X-RAY EXAM CHEST 2 VIEWS: CPT | Mod: TC

## 2019-08-07 PROCEDURE — 3078F DIAST BP <80 MM HG: CPT | Mod: CPTII,S$GLB,, | Performed by: INTERNAL MEDICINE

## 2019-08-07 PROCEDURE — 99999 PR PBB SHADOW E&M-EST. PATIENT-LVL V: CPT | Mod: PBBFAC,,, | Performed by: INTERNAL MEDICINE

## 2019-08-11 VITALS
TEMPERATURE: 99 F | WEIGHT: 183.63 LBS | SYSTOLIC BLOOD PRESSURE: 126 MMHG | HEART RATE: 62 BPM | OXYGEN SATURATION: 96 % | BODY MASS INDEX: 26.29 KG/M2 | DIASTOLIC BLOOD PRESSURE: 68 MMHG | HEIGHT: 70 IN

## 2019-08-12 ENCOUNTER — OFFICE VISIT (OUTPATIENT)
Dept: CARDIOLOGY | Facility: CLINIC | Age: 64
End: 2019-08-12
Payer: COMMERCIAL

## 2019-08-12 VITALS
BODY MASS INDEX: 26.67 KG/M2 | HEIGHT: 70 IN | WEIGHT: 186.31 LBS | SYSTOLIC BLOOD PRESSURE: 134 MMHG | DIASTOLIC BLOOD PRESSURE: 80 MMHG | HEART RATE: 64 BPM

## 2019-08-12 DIAGNOSIS — I49.1 PAC (PREMATURE ATRIAL CONTRACTION): Primary | ICD-10-CM

## 2019-08-12 DIAGNOSIS — I51.7 ATHLETE'S HEART: ICD-10-CM

## 2019-08-12 DIAGNOSIS — R00.2 PALPITATIONS: ICD-10-CM

## 2019-08-12 LAB
OHS CV EVENT MONITOR DAY: 0
OHS CV HOLTER LENGTH DECIMAL HOURS: 24
OHS CV HOLTER LENGTH HOURS: 24
OHS CV HOLTER LENGTH MINUTES: 0

## 2019-08-12 PROCEDURE — 93000 EKG 12-LEAD: ICD-10-PCS | Mod: S$GLB,,, | Performed by: INTERNAL MEDICINE

## 2019-08-12 PROCEDURE — 3075F PR MOST RECENT SYSTOLIC BLOOD PRESS GE 130-139MM HG: ICD-10-PCS | Mod: CPTII,S$GLB,, | Performed by: INTERNAL MEDICINE

## 2019-08-12 PROCEDURE — 99999 PR PBB SHADOW E&M-EST. PATIENT-LVL III: CPT | Mod: PBBFAC,,, | Performed by: INTERNAL MEDICINE

## 2019-08-12 PROCEDURE — 3008F PR BODY MASS INDEX (BMI) DOCUMENTED: ICD-10-PCS | Mod: CPTII,S$GLB,, | Performed by: INTERNAL MEDICINE

## 2019-08-12 PROCEDURE — 99999 PR PBB SHADOW E&M-EST. PATIENT-LVL III: ICD-10-PCS | Mod: PBBFAC,,, | Performed by: INTERNAL MEDICINE

## 2019-08-12 PROCEDURE — 99204 OFFICE O/P NEW MOD 45 MIN: CPT | Mod: S$GLB,,, | Performed by: INTERNAL MEDICINE

## 2019-08-12 PROCEDURE — 3079F DIAST BP 80-89 MM HG: CPT | Mod: CPTII,S$GLB,, | Performed by: INTERNAL MEDICINE

## 2019-08-12 PROCEDURE — 3008F BODY MASS INDEX DOCD: CPT | Mod: CPTII,S$GLB,, | Performed by: INTERNAL MEDICINE

## 2019-08-12 PROCEDURE — 99204 PR OFFICE/OUTPT VISIT, NEW, LEVL IV, 45-59 MIN: ICD-10-PCS | Mod: S$GLB,,, | Performed by: INTERNAL MEDICINE

## 2019-08-12 PROCEDURE — 3079F PR MOST RECENT DIASTOLIC BLOOD PRESSURE 80-89 MM HG: ICD-10-PCS | Mod: CPTII,S$GLB,, | Performed by: INTERNAL MEDICINE

## 2019-08-12 PROCEDURE — 93000 ELECTROCARDIOGRAM COMPLETE: CPT | Mod: S$GLB,,, | Performed by: INTERNAL MEDICINE

## 2019-08-12 PROCEDURE — 3075F SYST BP GE 130 - 139MM HG: CPT | Mod: CPTII,S$GLB,, | Performed by: INTERNAL MEDICINE

## 2019-08-12 NOTE — PROGRESS NOTES
Subjective:       Patient ID: Matthew Pisano is a 64 y.o. male.    Chief Complaint: Establish Care    HPI  He is here for an initial visit.  He returns for management of hyperlipidemia.  He has had hyperlipidemia for over a year.  Current treatment has included medications outlined medication list.  He complains of occasional palpitations.  Has had some associated dyspnea on exertion.  Denies pain located in his chest    Past medical history:  Hyperlipidemia, cervical spinal stenosis, family history of colon cancer, colon adenoma.  He had a colonoscopy April 2018    Medications:  Lipitor 10 mg daily    No known drug allergies      Review of Systems   Constitutional: Negative for chills, fatigue, fever and unexpected weight change.   Respiratory: Negative for chest tightness and shortness of breath.    Cardiovascular: Negative for chest pain and palpitations.   Gastrointestinal: Negative for abdominal pain and blood in stool.   Neurological: Negative for dizziness, syncope, numbness and headaches.       Objective:      Physical Exam   HENT:   Right Ear: External ear normal.   Left Ear: External ear normal.   Nose: Nose normal.   Mouth/Throat: Oropharynx is clear and moist.   Eyes: Pupils are equal, round, and reactive to light.   Neck: Normal range of motion.   Cardiovascular: Normal rate and regular rhythm.   No murmur heard.  Pulmonary/Chest: Breath sounds normal.   Abdominal: He exhibits no distension. There is no hepatomegaly. There is no tenderness.   Lymphadenopathy:     He has no cervical adenopathy.     He has no axillary adenopathy.   Neurological: He has normal strength and normal reflexes. No cranial nerve deficit or sensory deficit.       Assessment/Plan       Assessment and plan:  1.  Hyperlipidemia:  Check CMP and lipid panel  2.  Palpitations:  Schedule Holter monitor and cardiology appointment.  Check CBC, TSH and chest x-ray  3.  Check PSA

## 2019-08-12 NOTE — PROGRESS NOTES
"Subjective:   Patient ID:  Matthew Pisano is a 64 y.o. male is a new patient who presents for evaluation of Palpitations and Shortness of Breath      HPI:   Palpitations after eating heavy foods. Patient was taking up a flight of stairs and taking shower felt SOB. Patient has been under a whole lot of stress.   Patients runs on a treadmill 5 days a week and does weights.  Father was very healthy  of MI at 93. Mother had heart palpations.  Patient consumes 2,3 times of coffee  No chest pain, Orthopnea, PND of heart failure symptoms.   Patient had a bout of vertigo 3 months ago.   Patient does HIT on treadmill and will do bench press of 200 pounds (every 3-4 days) for 3-4 sets (15 min).     The ASCVD Risk score (Morovladimir OLIVARES Jr., et al., 2013) failed to calculate for the following reasons:    Cannot find a previous HDL lab    Cannot find a previous total cholesterol lab      Patient Active Problem List   Diagnosis    Other and unspecified hyperlipidemia    Epigastric abdominal pain    Sebaceous cyst of right axilla    Special screening for malignant neoplasms, colon     /80   Pulse 64   Ht 5' 10" (1.778 m)   Wt 84.5 kg (186 lb 4.6 oz)   BMI 26.73 kg/m²   Body mass index is 26.73 kg/m².  Estimated Creatinine Clearance: 86.7 mL/min (based on SCr of 0.9 mg/dL).    Lab Results   Component Value Date     2019    K 4.4 2019     2019    CO2 27 2019    BUN 20 2019    CREATININE 0.9 2019    GLU 97 2019    AST 18 2019    ALT 20 2019    ALBUMIN 3.7 2019    PROT 6.1 2019    BILITOT 0.4 2019    WBC 5.66 2019    HGB 14.9 2019    HCT 45.3 2019    MCV 92 2019     2019    PSA 1.3 2014    TSH 1.419 2012    CHOL 218 (H) 2015    HDL 60 2015    LDLCALC 144.4 2015    TRIG 68 2015       Current Outpatient Medications   Medication Sig    ascorbic acid (VITAMIN C) 500 MG " tablet Take by mouth. 1 Tablet Oral Every day    atorvastatin (LIPITOR) 10 MG tablet TAKE 1 TABLET (10 MG TOTAL) BY MOUTH ONCE DAILY.    atorvastatin (LIPITOR) 10 MG tablet Take 1 tablet (10 mg total) by mouth once daily.    multivitamin with minerals tablet Take 1 tablet by mouth once daily.    b complex vitamins tablet 1 Tablet Oral Every day    coenzyme Q10 (CO Q-10) 400 mg Cap 1 Capsule Oral Every day    meclizine (ANTIVERT) 25 mg tablet Take 1 tablet (25 mg total) by mouth 3 (three) times daily as needed for Dizziness.    NEXIUM 40 mg capsule     omega-3 fatty acids-vitamin E (FISH OIL) 1,000 mg Cap 1 Capsule Oral Every day    ondansetron (ZOFRAN) 4 MG tablet Take 1 tablet (4 mg total) by mouth every 8 (eight) hours as needed for Nausea.    red yeast rice 600 mg Cap 1 Capsule Oral Every day    sildenafil (VIAGRA) 50 MG tablet 1 Tablet Oral Every day prn    tadalafil (CIALIS) 5 MG tablet Take by mouth. 1 Tablet Oral qd.  Dispense 1 box of Cialis for daily use.     No current facility-administered medications for this visit.        Review of Systems   Constitution: Negative for chills, decreased appetite, malaise/fatigue, night sweats, weight gain and weight loss.   Eyes: Negative for blurred vision, double vision, visual disturbance and visual halos.   Cardiovascular: Positive for palpitations. Negative for chest pain, claudication, cyanosis, dyspnea on exertion, irregular heartbeat, leg swelling, near-syncope, orthopnea, paroxysmal nocturnal dyspnea and syncope.   Respiratory: Negative for cough, hemoptysis, snoring, sputum production and wheezing.    Endocrine: Negative for cold intolerance, heat intolerance, polydipsia and polyphagia.   Hematologic/Lymphatic: Negative for adenopathy and bleeding problem. Does not bruise/bleed easily.   Skin: Negative for flushing, itching, poor wound healing and rash.   Musculoskeletal: Negative for arthritis, back pain, falls, gout, joint pain, joint swelling,  muscle cramps, muscle weakness, myalgias, neck pain and stiffness.   Gastrointestinal: Negative for bloating, abdominal pain, anorexia, diarrhea, dysphagia, excessive appetite, flatus, hematemesis, jaundice, melena and nausea.   Genitourinary: Negative for hesitancy and incomplete emptying.   Neurological: Negative for aphonia, brief paralysis, difficulty with concentration, disturbances in coordination, excessive daytime sleepiness, dizziness, focal weakness, light-headedness, loss of balance and weakness.   Psychiatric/Behavioral: Negative for altered mental status, depression, hallucinations, hypervigilance, memory loss, substance abuse and suicidal ideas. The patient does not have insomnia and is not nervous/anxious.        Objective:   Physical Exam   Constitutional: He is oriented to person, place, and time. He appears well-developed and well-nourished. No distress.   HENT:   Head: Normocephalic and atraumatic.   Nose: Nose normal.   Mouth/Throat: No oropharyngeal exudate.   Eyes: Pupils are equal, round, and reactive to light. Conjunctivae and EOM are normal. Right eye exhibits no discharge. Left eye exhibits no discharge. No scleral icterus.   Neck: Normal range of motion. Neck supple. No JVD present. No tracheal deviation present. No thyromegaly present.   Cardiovascular: Normal rate, regular rhythm, normal heart sounds and intact distal pulses. Exam reveals no gallop and no friction rub.   No murmur heard.  Pulmonary/Chest: Effort normal and breath sounds normal. No stridor. No respiratory distress. He has no wheezes. He has no rales. He exhibits no tenderness.   Abdominal: Soft. Bowel sounds are normal. He exhibits no distension and no mass. There is no tenderness.   Musculoskeletal: He exhibits no edema or tenderness.   Lymphadenopathy:     He has no cervical adenopathy.   Neurological: He is alert and oriented to person, place, and time. He displays normal reflexes. No cranial nerve deficit. He exhibits  normal muscle tone. Coordination normal.   Skin: Skin is warm. No rash noted. He is not diaphoretic. No erythema. No pallor.   Psychiatric: He has a normal mood and affect. His behavior is normal. Judgment and thought content normal.       Assessment:     1. PAC (premature atrial contraction)    2. Athlete's heart    3. Palpitations        Plan:   evaluate for athletes heart with 200 pound bench presses. Will do a  Baseline EKG. We discussed frequent PACs and that getting off stimulants and decrease stress will further re evaluate with a holter and consider beta blocker than.    Matthew was seen today for palpitations and shortness of breath.    Diagnoses and all orders for this visit:    PAC (premature atrial contraction)  -     IN OFFICE EKG 12-LEAD (to Muse)  -     IN OFFICE EKG 12-LEAD (to Muse)  -     Transthoracic echo (TTE) 2D with Color Flow; Future    Athlete's heart  -     Transthoracic echo (TTE) 2D with Color Flow; Future    Palpitations      RTC 6 wks,.

## 2019-08-12 NOTE — LETTER
August 12, 2019      Leticia Andersen MD  [address]  [place] [address]           [facility] - Cardiology  [address]  [place] [address]  Phone: [phone]          Patient: Matthew Pisano   MR Number: [id number]   YOB: 1955   Date of Visit: 8/12/2019       Dear Dr. Leticia Andersen:    Thank you for referring Matthew Pisano to me for evaluation. Attached you will find relevant portions of my assessment and plan of care.    If you have questions, please do not hesitate to call me. I look forward to following Matthew Pisano along with you.    Sincerely,    Crystal Fitzgerald MD    Enclosure  CC:  No Recipients    If you would like to receive this communication electronically, please contact [email or web address] or [phone] to request more information on [employer] access.    For providers and/or their staff who would like to refer a patient to [facility], please contact us through our one-stop-shop provider referral line, [facility] , at [phone].    If you feel you have received this communication in error or would no longer like to receive these types of communications, please e-mail [email or web address]

## 2019-08-14 ENCOUNTER — HOSPITAL ENCOUNTER (OUTPATIENT)
Dept: CARDIOLOGY | Facility: CLINIC | Age: 64
Discharge: HOME | End: 2019-08-14
Attending: INTERNAL MEDICINE
Payer: COMMERCIAL

## 2019-08-14 VITALS
BODY MASS INDEX: 26.63 KG/M2 | HEART RATE: 62 BPM | WEIGHT: 186 LBS | HEIGHT: 70 IN | DIASTOLIC BLOOD PRESSURE: 80 MMHG | SYSTOLIC BLOOD PRESSURE: 134 MMHG

## 2019-08-14 DIAGNOSIS — I51.7 ATHLETE'S HEART: ICD-10-CM

## 2019-08-14 DIAGNOSIS — I49.1 PAC (PREMATURE ATRIAL CONTRACTION): ICD-10-CM

## 2019-08-14 PROCEDURE — 93306 TRANSTHORACIC ECHO (TTE) COMPLETE (CUPID ONLY): ICD-10-PCS | Mod: 26,,, | Performed by: INTERNAL MEDICINE

## 2019-08-14 PROCEDURE — 93306 TTE W/DOPPLER COMPLETE: CPT | Mod: 26,,, | Performed by: INTERNAL MEDICINE

## 2019-08-14 PROCEDURE — 93306 TTE W/DOPPLER COMPLETE: CPT

## 2019-08-15 ENCOUNTER — TELEPHONE (OUTPATIENT)
Dept: CARDIOLOGY | Facility: CLINIC | Age: 64
End: 2019-08-15

## 2019-08-15 LAB
ASCENDING AORTA: 3.31 CM
BSA FOR ECHO PROCEDURE: 2.04 M2
CV ECHO LV RWT: 0.31 CM
DOP CALC LVOT AREA: 3.6 CM2
DOP CALC LVOT DIAMETER: 2.13 CM
DOP CALC LVOT PEAK VEL: 1.3 M/S
DOP CALC LVOT STROKE VOLUME: 82.16 CM3
DOP CALCLVOT PEAK VEL VTI: 23.07 CM
E WAVE DECELERATION TIME: 213.48 MSEC
E/A RATIO: 1.17
E/E' RATIO: 6.38 M/S
ECHO LV POSTERIOR WALL: 0.8 CM (ref 0.6–1.1)
FRACTIONAL SHORTENING: 34 % (ref 28–44)
INTERVENTRICULAR SEPTUM: 0.7 CM (ref 0.6–1.1)
IVRT: 0.07 MSEC
LA MAJOR: 5.04 CM
LA MINOR: 5.13 CM
LA WIDTH: 3.86 CM
LEFT ATRIUM SIZE: 3.74 CM
LEFT ATRIUM VOLUME INDEX: 30.8 ML/M2
LEFT ATRIUM VOLUME: 62.39 CM3
LEFT INTERNAL DIMENSION IN SYSTOLE: 3.45 CM (ref 2.1–4)
LEFT VENTRICLE DIASTOLIC VOLUME INDEX: 64.88 ML/M2
LEFT VENTRICLE DIASTOLIC VOLUME: 131.31 ML
LEFT VENTRICLE MASS INDEX: 67 G/M2
LEFT VENTRICLE SYSTOLIC VOLUME INDEX: 24.3 ML/M2
LEFT VENTRICLE SYSTOLIC VOLUME: 49.24 ML
LEFT VENTRICULAR INTERNAL DIMENSION IN DIASTOLE: 5.23 CM (ref 3.5–6)
LEFT VENTRICULAR MASS: 135.19 G
LV LATERAL E/E' RATIO: 5.19 M/S
LV SEPTAL E/E' RATIO: 8.3 M/S
MV PEAK A VEL: 0.71 M/S
MV PEAK E VEL: 0.83 M/S
PISA TR MAX VEL: 1.96 M/S
PULM VEIN S/D RATIO: 1.15
PV PEAK D VEL: 0.55 M/S
PV PEAK S VEL: 0.63 M/S
RA MAJOR: 5.15 CM
RA PRESSURE: 8 MMHG
RA WIDTH: 3.33 CM
RIGHT VENTRICULAR END-DIASTOLIC DIMENSION: 3.34 CM
RV TISSUE DOPPLER FREE WALL SYSTOLIC VELOCITY 1 (APICAL 4 CHAMBER VIEW): 14.26 CM/S
SINUS: 3.42 CM
STJ: 2.82 CM
TDI LATERAL: 0.16 M/S
TDI SEPTAL: 0.1 M/S
TDI: 0.13 M/S
TR MAX PG: 15 MMHG
TRICUSPID ANNULAR PLANE SYSTOLIC EXCURSION: 2.94 CM
TV REST PULMONARY ARTERY PRESSURE: 23 MMHG

## 2019-08-15 NOTE — PROGRESS NOTES
plz let pt. Know that echo (ultrasound of the heart) is normal. There are no signs of muscle thickening to suggest an athlete's heart and that he can continue to bench press as he was doing. There is no contraindication for now.

## 2019-08-15 NOTE — TELEPHONE ENCOUNTER
----- Message from Crystal Fitzgerald MD sent at 8/15/2019  1:27 PM CDT -----  plz let pt. Know that echo (ultrasound of the heart) is normal. There are no signs of muscle thickening to suggest an athlete's heart and that he can continue to bench press as he was doing. There is no contraindication for now.

## 2019-08-16 ENCOUNTER — TELEPHONE (OUTPATIENT)
Dept: CARDIOLOGY | Facility: CLINIC | Age: 64
End: 2019-08-16

## 2019-08-16 DIAGNOSIS — R00.2 PALPITATIONS: Primary | ICD-10-CM

## 2019-08-16 RX ORDER — METOPROLOL TARTRATE 25 MG/1
25 TABLET, FILM COATED ORAL
Qty: 30 TABLET | Refills: 3 | Status: SHIPPED | OUTPATIENT
Start: 2019-08-16 | End: 2023-11-03 | Stop reason: SDUPTHER

## 2019-08-16 NOTE — TELEPHONE ENCOUNTER
He can take MTP as needed.i sent it to the pharmacy as well as do a holter test. Do not take more than 1 metoprolol per day

## 2019-08-20 ENCOUNTER — TELEPHONE (OUTPATIENT)
Dept: INTERNAL MEDICINE | Facility: CLINIC | Age: 64
End: 2019-08-20

## 2019-08-20 NOTE — TELEPHONE ENCOUNTER
----- Message from Marlin Way sent at 8/20/2019  4:51 PM CDT -----  Contact: self   Patient is returning a phone call.  Who left a message for the patient: Terri Barnes MA  Does patient know what this is regarding:  Test results  Comments:

## 2019-08-20 NOTE — TELEPHONE ENCOUNTER
Please let him know all of his results are acceptable. Please advise him to follow up with cardiology as scheduled

## 2019-08-20 NOTE — TELEPHONE ENCOUNTER
----- Message from Candice Phan sent at 8/20/2019 11:36 AM CDT -----  Contact: [phone]  Patient would like to get test results  Name of test (lab, mammo, etc.):   X ray/labs  Date of test:  8/7 & 8/12  Ordering provider: Nathaly   Where was the test performed:  [facility]  Would the patient rather a call back or a response via [facility]?:  Call back   Comments:

## 2019-08-23 ENCOUNTER — TELEPHONE (OUTPATIENT)
Dept: INTERNAL MEDICINE | Facility: CLINIC | Age: 64
End: 2019-08-23

## 2019-09-12 ENCOUNTER — TELEPHONE (OUTPATIENT)
Dept: OTOLARYNGOLOGY | Facility: CLINIC | Age: 64
End: 2019-09-12

## 2019-09-12 ENCOUNTER — NURSE TRIAGE (OUTPATIENT)
Dept: ADMINISTRATIVE | Facility: CLINIC | Age: 64
End: 2019-09-12

## 2019-09-12 NOTE — TELEPHONE ENCOUNTER
Spoke with patient and he stated that he will go and see his ENT on tomorrow to see what is going on, and declined an appointment with any one else. Patient stated an verbal understanding.

## 2019-09-12 NOTE — TELEPHONE ENCOUNTER
----- Message from Margy Lundberg sent at 9/12/2019 10:39 AM CDT -----  Contact: pt   Type:  Same Day Appointment Request    Caller is requesting a same day appointment.  Caller declined first available appointment listed below.    Name of Caller:Matthew    When is the first available appointment?10/14    Symptoms:vertigo/ dizziness/ pt can barely walk    Best Call Back Number:[phone]    Additional Information:Same Day Appt Request

## 2019-09-12 NOTE — TELEPHONE ENCOUNTER
----- Message from Saida Decker MA sent at 9/12/2019 10:51 AM CDT -----  Contact: pt   Hey Ladies do yall have anything with Brantley or Amedee ?  ----- Message -----  From: Margy Lundberg  Sent: 9/12/2019  10:39 AM  To: Zohreh Page RN, #    Type:  Same Day Appointment Request    Caller is requesting a same day appointment.  Caller declined first available appointment listed below.    Name of Caller:Matthew    When is the first available appointment?10/14    Symptoms:vertigo/ dizziness/ pt can barely walk    Best Call Back Number:[phone]    Additional Information:Same Day Appt Request

## 2019-09-12 NOTE — TELEPHONE ENCOUNTER
Pt reports dizziness on and off for a couple of months. Was seen at  months ago and given Meclezine which relieved his symptoms. Pt states that last night he bent over and was extremely dizzy upon rising. Pt scheduled to see Md today. Pt really wants to see his PCP but nothing was available. Requesting that PCP contact patient if appt is available today. Please contact patient to advise    Reason for Disposition   Dizziness not present now, but is a chronic symptom (recurrent or ongoing AND lasting > 4 weeks)    Additional Information   Negative: Shock suspected (e.g., cold/pale/clammy skin, too weak to stand, low BP, rapid pulse)   Negative: Difficult to awaken or acting confused (e.g., disoriented, slurred speech)   Negative: Fainted, and still feels dizzy afterwards   Negative: Severe difficulty breathing (e.g., struggling for each breath, speaks in single words)   Negative: Overdose (accidental or intentional) of medications   Negative: New neurologic deficit that is present now: * Weakness of the face, arm, or leg on one side of the body * Numbness of the face, arm, or leg on one side of the body * Loss of speech or garbled speech   Negative: Heart beating < 50 beats per minute OR > 140 beats per minute   Negative: Sounds like a life-threatening emergency to the triager   Negative: Chest pain   Negative: Rectal bleeding, bloody stool, or tarry-black stool   Negative: Vomiting is the main symptom   Negative: Diarrhea is the main symptom   Negative: Headache is the main symptom   Negative: Heat exhaustion suspected (i.e., dehydration from heat exposure)   Negative: Patient states that he/she is having an anxiety/panic attack   Negative: SEVERE dizziness (e.g., unable to stand, requires support to walk, feels like passing out now)   Negative: Severe headache   Negative: Extra heart beats OR irregular heart beating (i.e., 'palpitations')   Negative: Difficulty breathing   Negative: Drinking  very little and has signs of dehydration (e.g., no urine > 12 hours, very dry mouth, very lightheaded)   Negative: Follows bleeding (e.g., stomach, rectum, vagina) (Exception: became dizzy from sight of small amount blood)   Negative: Patient sounds very sick or weak to the triager   Negative: Lightheadedness (dizziness) present now, after 2 hours of rest and fluids   Negative: Spinning or tilting sensation (vertigo) present now   Negative: Fever > 103 F (39.4 C)   Negative: Fever > 100.0 F (37.8 C) and has diabetes mellitus or a weak immune system (e.g., HIV positive, cancer chemotherapy, organ transplant, splenectomy, chronic steroids)   Negative: Vomiting occurs with dizziness   Negative: Patient wants to be seen   Negative: Taking a medicine that could cause dizziness (e.g., blood pressure medications, diuretics)   Negative: Diabetes    Protocols used: DIZZINESS-A-OH

## 2021-01-21 ENCOUNTER — TELEPHONE (OUTPATIENT)
Dept: INTERNAL MEDICINE | Facility: CLINIC | Age: 66
End: 2021-01-21

## 2021-01-21 RX ORDER — BENZONATATE 200 MG/1
200 CAPSULE ORAL 3 TIMES DAILY PRN
Qty: 30 CAPSULE | Refills: 0 | Status: SHIPPED | OUTPATIENT
Start: 2021-01-21 | End: 2021-01-26

## 2021-01-26 ENCOUNTER — OFFICE VISIT (OUTPATIENT)
Dept: INTERNAL MEDICINE | Facility: CLINIC | Age: 66
End: 2021-01-26
Payer: COMMERCIAL

## 2021-01-26 ENCOUNTER — LAB VISIT (OUTPATIENT)
Dept: LAB | Facility: HOSPITAL | Age: 66
End: 2021-01-26
Attending: INTERNAL MEDICINE
Payer: COMMERCIAL

## 2021-01-26 DIAGNOSIS — Z00.00 PREVENTATIVE HEALTH CARE: ICD-10-CM

## 2021-01-26 DIAGNOSIS — Z01.84 ENCOUNTER FOR ANTIBODY RESPONSE EXAMINATION: ICD-10-CM

## 2021-01-26 DIAGNOSIS — Z00.00 PREVENTATIVE HEALTH CARE: Primary | ICD-10-CM

## 2021-01-26 LAB
ALBUMIN SERPL BCP-MCNC: 3.6 G/DL (ref 3.5–5.2)
ALP SERPL-CCNC: 51 U/L (ref 55–135)
ALT SERPL W/O P-5'-P-CCNC: 52 U/L (ref 10–44)
ANION GAP SERPL CALC-SCNC: 7 MMOL/L (ref 8–16)
AST SERPL-CCNC: 36 U/L (ref 10–40)
BASOPHILS # BLD AUTO: 0.03 K/UL (ref 0–0.2)
BASOPHILS NFR BLD: 0.6 % (ref 0–1.9)
BILIRUB SERPL-MCNC: 0.6 MG/DL (ref 0.1–1)
BUN SERPL-MCNC: 19 MG/DL (ref 8–23)
CALCIUM SERPL-MCNC: 8.6 MG/DL (ref 8.7–10.5)
CHLORIDE SERPL-SCNC: 107 MMOL/L (ref 95–110)
CHOLEST SERPL-MCNC: 210 MG/DL (ref 120–199)
CHOLEST/HDLC SERPL: 5.5 {RATIO} (ref 2–5)
CO2 SERPL-SCNC: 28 MMOL/L (ref 23–29)
CREAT SERPL-MCNC: 0.8 MG/DL (ref 0.5–1.4)
DIFFERENTIAL METHOD: ABNORMAL
EOSINOPHIL # BLD AUTO: 0.1 K/UL (ref 0–0.5)
EOSINOPHIL NFR BLD: 2.3 % (ref 0–8)
ERYTHROCYTE [DISTWIDTH] IN BLOOD BY AUTOMATED COUNT: 12.1 % (ref 11.5–14.5)
EST. GFR  (AFRICAN AMERICAN): >60 ML/MIN/1.73 M^2
EST. GFR  (NON AFRICAN AMERICAN): >60 ML/MIN/1.73 M^2
GLUCOSE SERPL-MCNC: 91 MG/DL (ref 70–110)
HCT VFR BLD AUTO: 44.2 % (ref 40–54)
HDLC SERPL-MCNC: 38 MG/DL (ref 40–75)
HDLC SERPL: 18.1 % (ref 20–50)
HGB BLD-MCNC: 14.4 G/DL (ref 14–18)
IMM GRANULOCYTES # BLD AUTO: 0.04 K/UL (ref 0–0.04)
IMM GRANULOCYTES NFR BLD AUTO: 0.8 % (ref 0–0.5)
LDLC SERPL CALC-MCNC: 155.2 MG/DL (ref 63–159)
LYMPHOCYTES # BLD AUTO: 1.5 K/UL (ref 1–4.8)
LYMPHOCYTES NFR BLD: 28.9 % (ref 18–48)
MCH RBC QN AUTO: 29.7 PG (ref 27–31)
MCHC RBC AUTO-ENTMCNC: 32.6 G/DL (ref 32–36)
MCV RBC AUTO: 91 FL (ref 82–98)
MONOCYTES # BLD AUTO: 0.5 K/UL (ref 0.3–1)
MONOCYTES NFR BLD: 10.3 % (ref 4–15)
NEUTROPHILS # BLD AUTO: 3 K/UL (ref 1.8–7.7)
NEUTROPHILS NFR BLD: 57.1 % (ref 38–73)
NONHDLC SERPL-MCNC: 172 MG/DL
NRBC BLD-RTO: 0 /100 WBC
PLATELET # BLD AUTO: 206 K/UL (ref 150–350)
PMV BLD AUTO: 10.4 FL (ref 9.2–12.9)
POTASSIUM SERPL-SCNC: 4 MMOL/L (ref 3.5–5.1)
PROT SERPL-MCNC: 6.5 G/DL (ref 6–8.4)
RBC # BLD AUTO: 4.85 M/UL (ref 4.6–6.2)
SARS-COV-2 IGG SERPLBLD QL IA.RAPID: NEGATIVE
SODIUM SERPL-SCNC: 142 MMOL/L (ref 136–145)
TRIGL SERPL-MCNC: 84 MG/DL (ref 30–150)
WBC # BLD AUTO: 5.23 K/UL (ref 3.9–12.7)

## 2021-01-26 PROCEDURE — 1126F AMNT PAIN NOTED NONE PRSNT: CPT | Mod: S$GLB,,, | Performed by: INTERNAL MEDICINE

## 2021-01-26 PROCEDURE — 1126F PR PAIN SEVERITY QUANTIFIED, NO PAIN PRESENT: ICD-10-PCS | Mod: S$GLB,,, | Performed by: INTERNAL MEDICINE

## 2021-01-26 PROCEDURE — 85025 COMPLETE CBC W/AUTO DIFF WBC: CPT

## 2021-01-26 PROCEDURE — 3074F SYST BP LT 130 MM HG: CPT | Mod: CPTII,S$GLB,, | Performed by: INTERNAL MEDICINE

## 2021-01-26 PROCEDURE — 99396 PREV VISIT EST AGE 40-64: CPT | Mod: S$GLB,,, | Performed by: INTERNAL MEDICINE

## 2021-01-26 PROCEDURE — 80053 COMPREHEN METABOLIC PANEL: CPT

## 2021-01-26 PROCEDURE — 3008F BODY MASS INDEX DOCD: CPT | Mod: CPTII,S$GLB,, | Performed by: INTERNAL MEDICINE

## 2021-01-26 PROCEDURE — 99999 PR PBB SHADOW E&M-EST. PATIENT-LVL IV: ICD-10-PCS | Mod: PBBFAC,,, | Performed by: INTERNAL MEDICINE

## 2021-01-26 PROCEDURE — 3078F PR MOST RECENT DIASTOLIC BLOOD PRESSURE < 80 MM HG: ICD-10-PCS | Mod: CPTII,S$GLB,, | Performed by: INTERNAL MEDICINE

## 2021-01-26 PROCEDURE — 3008F PR BODY MASS INDEX (BMI) DOCUMENTED: ICD-10-PCS | Mod: CPTII,S$GLB,, | Performed by: INTERNAL MEDICINE

## 2021-01-26 PROCEDURE — 99396 PR PREVENTIVE VISIT,EST,40-64: ICD-10-PCS | Mod: S$GLB,,, | Performed by: INTERNAL MEDICINE

## 2021-01-26 PROCEDURE — 3078F DIAST BP <80 MM HG: CPT | Mod: CPTII,S$GLB,, | Performed by: INTERNAL MEDICINE

## 2021-01-26 PROCEDURE — 80061 LIPID PANEL: CPT

## 2021-01-26 PROCEDURE — 86769 SARS-COV-2 COVID-19 ANTIBODY: CPT

## 2021-01-26 PROCEDURE — 84153 ASSAY OF PSA TOTAL: CPT

## 2021-01-26 PROCEDURE — 99999 PR PBB SHADOW E&M-EST. PATIENT-LVL IV: CPT | Mod: PBBFAC,,, | Performed by: INTERNAL MEDICINE

## 2021-01-26 PROCEDURE — 3074F PR MOST RECENT SYSTOLIC BLOOD PRESSURE < 130 MM HG: ICD-10-PCS | Mod: CPTII,S$GLB,, | Performed by: INTERNAL MEDICINE

## 2021-01-26 RX ORDER — ATORVASTATIN CALCIUM 10 MG/1
10 TABLET, FILM COATED ORAL DAILY
Qty: 30 TABLET | Refills: 7 | Status: SHIPPED | OUTPATIENT
Start: 2021-01-26 | End: 2021-02-01 | Stop reason: ALTCHOICE

## 2021-01-26 RX ORDER — TRAMADOL HYDROCHLORIDE 50 MG/1
50 TABLET ORAL
COMMUNITY
Start: 2020-03-10 | End: 2023-06-07

## 2021-01-27 LAB — COMPLEXED PSA SERPL-MCNC: 1.4 NG/ML (ref 0–4)

## 2021-01-30 ENCOUNTER — TELEPHONE (OUTPATIENT)
Dept: INTERNAL MEDICINE | Facility: CLINIC | Age: 66
End: 2021-01-30

## 2021-01-30 DIAGNOSIS — E78.5 HYPERLIPIDEMIA, UNSPECIFIED HYPERLIPIDEMIA TYPE: Primary | ICD-10-CM

## 2021-01-31 VITALS
HEART RATE: 62 BPM | HEIGHT: 70 IN | WEIGHT: 182.56 LBS | SYSTOLIC BLOOD PRESSURE: 122 MMHG | OXYGEN SATURATION: 96 % | BODY MASS INDEX: 26.14 KG/M2 | TEMPERATURE: 99 F | DIASTOLIC BLOOD PRESSURE: 70 MMHG

## 2021-02-01 RX ORDER — ATORVASTATIN CALCIUM 20 MG/1
20 TABLET, FILM COATED ORAL DAILY
Qty: 90 TABLET | Refills: 1 | Status: SHIPPED | OUTPATIENT
Start: 2021-02-01 | End: 2023-11-07 | Stop reason: SDUPTHER

## 2021-07-27 ENCOUNTER — TELEPHONE (OUTPATIENT)
Dept: INTERNAL MEDICINE | Facility: CLINIC | Age: 66
End: 2021-07-27

## 2021-07-27 DIAGNOSIS — M54.2 NECK PAIN: Primary | ICD-10-CM

## 2022-03-08 ENCOUNTER — TELEPHONE (OUTPATIENT)
Dept: OTOLARYNGOLOGY | Facility: CLINIC | Age: 67
End: 2022-03-08
Payer: COMMERCIAL

## 2022-03-08 NOTE — TELEPHONE ENCOUNTER
----- Message from Margo Sanchez RN sent at 3/8/2022  8:43 AM CST -----  Regarding: FW: Appt Request Today    ----- Message -----  From: Margo Sanchez RN  Sent: 3/8/2022   8:29 AM CST  To: Tiffany Alonzo LPN  Subject: FW: Appt Request Today                           Could we put this patient in Dr. Alfaro's 1:00 slot or does he have it private for a reason?       ----- Message -----  From: Barbara De La Fuente  Sent: 3/8/2022   8:09 AM CST  To: [facility] Ent Clinical Staff  Subject: Appt Request Today                               Regarding:Pt called to about being seen today for a sinus infection, pt was informed that Muslim had first avail 3/11. Pt stated he needed to be seen today.       Can patient be contacted on Mychart:No       Requesting Call back number:[phone]

## 2022-07-07 RX ORDER — ATORVASTATIN CALCIUM 20 MG/1
20 TABLET, FILM COATED ORAL DAILY
Qty: 90 TABLET | Refills: 1 | OUTPATIENT
Start: 2022-07-07 | End: 2023-07-07

## 2022-07-07 NOTE — TELEPHONE ENCOUNTER
No new care gaps identified.  [facility] Embedded Care Gaps. Reference number: [id number]. 7/07/2022   10:47:14 AM FERMINT

## 2022-09-19 ENCOUNTER — PATIENT OUTREACH (OUTPATIENT)
Dept: ADMINISTRATIVE | Facility: HOSPITAL | Age: 67
End: 2022-09-19
Payer: MEDICARE

## 2022-12-06 ENCOUNTER — TELEPHONE (OUTPATIENT)
Dept: INTERNAL MEDICINE | Facility: CLINIC | Age: 67
End: 2022-12-06
Payer: MEDICARE

## 2022-12-06 NOTE — TELEPHONE ENCOUNTER
----- Message from Asuncion Mayes sent at 12/6/2022 10:59 AM CST -----  Contact: [phone]  Pt called to advise that he would like to speak to the nurse abut being seen by the provider. Please Advise

## 2022-12-13 ENCOUNTER — OFFICE VISIT (OUTPATIENT)
Dept: INTERNAL MEDICINE | Facility: CLINIC | Age: 67
End: 2022-12-13
Payer: MEDICARE

## 2022-12-13 DIAGNOSIS — Z12.5 ENCOUNTER FOR SCREENING FOR MALIGNANT NEOPLASM OF PROSTATE: ICD-10-CM

## 2022-12-13 DIAGNOSIS — Z00.00 PREVENTATIVE HEALTH CARE: ICD-10-CM

## 2022-12-13 DIAGNOSIS — M25.519 SHOULDER PAIN, UNSPECIFIED CHRONICITY, UNSPECIFIED LATERALITY: ICD-10-CM

## 2022-12-13 DIAGNOSIS — E78.5 HYPERLIPIDEMIA, UNSPECIFIED HYPERLIPIDEMIA TYPE: Primary | ICD-10-CM

## 2022-12-13 PROCEDURE — 3079F PR MOST RECENT DIASTOLIC BLOOD PRESSURE 80-89 MM HG: ICD-10-PCS | Mod: HCNC,CPTII,S$GLB, | Performed by: INTERNAL MEDICINE

## 2022-12-13 PROCEDURE — 3008F BODY MASS INDEX DOCD: CPT | Mod: HCNC,CPTII,S$GLB, | Performed by: INTERNAL MEDICINE

## 2022-12-13 PROCEDURE — 1126F AMNT PAIN NOTED NONE PRSNT: CPT | Mod: HCNC,CPTII,S$GLB, | Performed by: INTERNAL MEDICINE

## 2022-12-13 PROCEDURE — 3079F DIAST BP 80-89 MM HG: CPT | Mod: HCNC,CPTII,S$GLB, | Performed by: INTERNAL MEDICINE

## 2022-12-13 PROCEDURE — 99999 PR PBB SHADOW E&M-EST. PATIENT-LVL IV: ICD-10-PCS | Mod: PBBFAC,HCNC,, | Performed by: INTERNAL MEDICINE

## 2022-12-13 PROCEDURE — 1101F PT FALLS ASSESS-DOCD LE1/YR: CPT | Mod: HCNC,CPTII,S$GLB, | Performed by: INTERNAL MEDICINE

## 2022-12-13 PROCEDURE — 1101F PR PT FALLS ASSESS DOC 0-1 FALLS W/OUT INJ PAST YR: ICD-10-PCS | Mod: HCNC,CPTII,S$GLB, | Performed by: INTERNAL MEDICINE

## 2022-12-13 PROCEDURE — 3288F PR FALLS RISK ASSESSMENT DOCUMENTED: ICD-10-PCS | Mod: HCNC,CPTII,S$GLB, | Performed by: INTERNAL MEDICINE

## 2022-12-13 PROCEDURE — 1126F PR PAIN SEVERITY QUANTIFIED, NO PAIN PRESENT: ICD-10-PCS | Mod: HCNC,CPTII,S$GLB, | Performed by: INTERNAL MEDICINE

## 2022-12-13 PROCEDURE — 99999 PR PBB SHADOW E&M-EST. PATIENT-LVL IV: CPT | Mod: PBBFAC,HCNC,, | Performed by: INTERNAL MEDICINE

## 2022-12-13 PROCEDURE — 3288F FALL RISK ASSESSMENT DOCD: CPT | Mod: HCNC,CPTII,S$GLB, | Performed by: INTERNAL MEDICINE

## 2022-12-13 PROCEDURE — 3008F PR BODY MASS INDEX (BMI) DOCUMENTED: ICD-10-PCS | Mod: HCNC,CPTII,S$GLB, | Performed by: INTERNAL MEDICINE

## 2022-12-13 PROCEDURE — 3075F SYST BP GE 130 - 139MM HG: CPT | Mod: HCNC,CPTII,S$GLB, | Performed by: INTERNAL MEDICINE

## 2022-12-13 PROCEDURE — 99397 PR PREVENTIVE VISIT,EST,65 & OVER: ICD-10-PCS | Mod: HCNC,S$GLB,, | Performed by: INTERNAL MEDICINE

## 2022-12-13 PROCEDURE — 3075F PR MOST RECENT SYSTOLIC BLOOD PRESS GE 130-139MM HG: ICD-10-PCS | Mod: HCNC,CPTII,S$GLB, | Performed by: INTERNAL MEDICINE

## 2022-12-13 PROCEDURE — 99397 PER PM REEVAL EST PAT 65+ YR: CPT | Mod: HCNC,S$GLB,, | Performed by: INTERNAL MEDICINE

## 2022-12-14 ENCOUNTER — OFFICE VISIT (OUTPATIENT)
Dept: SPORTS MEDICINE | Facility: CLINIC | Age: 67
End: 2022-12-14
Payer: MEDICARE

## 2022-12-14 ENCOUNTER — HOSPITAL ENCOUNTER (OUTPATIENT)
Dept: RADIOLOGY | Facility: HOSPITAL | Age: 67
Discharge: HOME | End: 2022-12-14
Attending: ORTHOPAEDIC SURGERY
Payer: MEDICARE

## 2022-12-14 VITALS — BODY MASS INDEX: 25.77 KG/M2 | WEIGHT: 180 LBS | HEIGHT: 70 IN

## 2022-12-14 DIAGNOSIS — M75.102 NONTRAUMATIC TEAR OF LEFT ROTATOR CUFF, UNSPECIFIED TEAR EXTENT: Primary | ICD-10-CM

## 2022-12-14 DIAGNOSIS — M25.519 SHOULDER PAIN, UNSPECIFIED CHRONICITY, UNSPECIFIED LATERALITY: ICD-10-CM

## 2022-12-14 PROCEDURE — 99999 PR PBB SHADOW E&M-EST. PATIENT-LVL III: CPT | Mod: PBBFAC,HCNC,, | Performed by: ORTHOPAEDIC SURGERY

## 2022-12-14 PROCEDURE — 1101F PR PT FALLS ASSESS DOC 0-1 FALLS W/OUT INJ PAST YR: ICD-10-PCS | Mod: HCNC,CPTII,S$GLB, | Performed by: ORTHOPAEDIC SURGERY

## 2022-12-14 PROCEDURE — 3008F PR BODY MASS INDEX (BMI) DOCUMENTED: ICD-10-PCS | Mod: HCNC,CPTII,S$GLB, | Performed by: ORTHOPAEDIC SURGERY

## 2022-12-14 PROCEDURE — 3288F FALL RISK ASSESSMENT DOCD: CPT | Mod: HCNC,CPTII,S$GLB, | Performed by: ORTHOPAEDIC SURGERY

## 2022-12-14 PROCEDURE — 73030 X-RAY EXAM OF SHOULDER: CPT | Mod: 26,50,, | Performed by: RADIOLOGY

## 2022-12-14 PROCEDURE — 1159F PR MEDICATION LIST DOCUMENTED IN MEDICAL RECORD: ICD-10-PCS | Mod: HCNC,CPTII,S$GLB, | Performed by: ORTHOPAEDIC SURGERY

## 2022-12-14 PROCEDURE — 3008F BODY MASS INDEX DOCD: CPT | Mod: HCNC,CPTII,S$GLB, | Performed by: ORTHOPAEDIC SURGERY

## 2022-12-14 PROCEDURE — 1125F AMNT PAIN NOTED PAIN PRSNT: CPT | Mod: HCNC,CPTII,S$GLB, | Performed by: ORTHOPAEDIC SURGERY

## 2022-12-14 PROCEDURE — 1159F MED LIST DOCD IN RCRD: CPT | Mod: HCNC,CPTII,S$GLB, | Performed by: ORTHOPAEDIC SURGERY

## 2022-12-14 PROCEDURE — 99204 PR OFFICE/OUTPT VISIT, NEW, LEVL IV, 45-59 MIN: ICD-10-PCS | Mod: HCNC,S$GLB,, | Performed by: ORTHOPAEDIC SURGERY

## 2022-12-14 PROCEDURE — 73030 XR SHOULDER COMPLETE 2 OR MORE VIEWS BILATERAL: ICD-10-PCS | Mod: 26,50,, | Performed by: RADIOLOGY

## 2022-12-14 PROCEDURE — 73030 X-RAY EXAM OF SHOULDER: CPT | Mod: TC,50

## 2022-12-14 PROCEDURE — 99999 PR PBB SHADOW E&M-EST. PATIENT-LVL III: ICD-10-PCS | Mod: PBBFAC,HCNC,, | Performed by: ORTHOPAEDIC SURGERY

## 2022-12-14 PROCEDURE — 1160F PR REVIEW ALL MEDS BY PRESCRIBER/CLIN PHARMACIST DOCUMENTED: ICD-10-PCS | Mod: HCNC,CPTII,S$GLB, | Performed by: ORTHOPAEDIC SURGERY

## 2022-12-14 PROCEDURE — 1125F PR PAIN SEVERITY QUANTIFIED, PAIN PRESENT: ICD-10-PCS | Mod: HCNC,CPTII,S$GLB, | Performed by: ORTHOPAEDIC SURGERY

## 2022-12-14 PROCEDURE — 3288F PR FALLS RISK ASSESSMENT DOCUMENTED: ICD-10-PCS | Mod: HCNC,CPTII,S$GLB, | Performed by: ORTHOPAEDIC SURGERY

## 2022-12-14 PROCEDURE — 1101F PT FALLS ASSESS-DOCD LE1/YR: CPT | Mod: HCNC,CPTII,S$GLB, | Performed by: ORTHOPAEDIC SURGERY

## 2022-12-14 PROCEDURE — 1160F RVW MEDS BY RX/DR IN RCRD: CPT | Mod: HCNC,CPTII,S$GLB, | Performed by: ORTHOPAEDIC SURGERY

## 2022-12-14 PROCEDURE — 99204 OFFICE O/P NEW MOD 45 MIN: CPT | Mod: HCNC,S$GLB,, | Performed by: ORTHOPAEDIC SURGERY

## 2022-12-14 NOTE — PROGRESS NOTES
CC: left Shoulder pain. Referred by [facility]. Owns [employer] [address].     67 y.o. RHD Male reports that the pain is severe and not responding to any conservative care.    1 mo atraumatic L should pain.  Pain and weakness with lateral shoulder abductions when working out.   Locates pain over lateral side.  Hasn't improved.  Tried Ice, rest, activity modification including limiting overhead activity and decreased weight when working out  No PT, CSI, or Sx.     Very active and works out daily.    He reports that the pain is worse with overhead activity. It also bothers him at night.    SANE: 60    PAST MEDICAL HISTORY:   Past Medical History:   Diagnosis Date    Hyperlipidemia      PAST SURGICAL HISTORY:   Past Surgical History:   Procedure Laterality Date    COLONOSCOPY N/A 4/9/2018    Procedure: COLONOSCOPY;  Surgeon: Tone Emmanuel MD;  Location: [address]);  Service: Endoscopy;  Laterality: N/A;     FAMILY HISTORY:   Family History   Problem Relation Age of Onset    Bladder Cancer Father     Pancreatic cancer Brother     Melanoma Neg Hx     Psoriasis Neg Hx     Lupus Neg Hx      SOCIAL HISTORY:   Social History     Socioeconomic History    Marital status: Single   Occupational History    Occupation: Restaurant     Employer: self   Tobacco Use    Smoking status: Never    Smokeless tobacco: Never   Substance and Sexual Activity    Alcohol use: Yes     Comment: Social    Drug use: No       MEDICATIONS:   Current Outpatient Medications:     ascorbic acid, vitamin C, (VITAMIN C) 500 MG tablet, Take by mouth. 1 Tablet Oral Every day, Disp: , Rfl:     b complex vitamins tablet, 1 Tablet Oral Every day, Disp: , Rfl:     coenzyme Q10 400 mg Cap, 1 Capsule Oral Every day, Disp: , Rfl:     multivitamin with minerals tablet, Take 1 tablet by mouth once daily., Disp: , Rfl:     NEXIUM 40 mg capsule, , Disp: , Rfl:     omega-3 fatty acids-vitamin E 1,000 mg Cap, 1 Capsule Oral Every day, Disp: , Rfl:     " red yeast rice 600 mg Cap, 1 Capsule Oral Every day, Disp: , Rfl:     sildenafiL (VIAGRA) 50 MG tablet, 1 Tablet Oral Every day prn, Disp: , Rfl:     tadalafiL (CIALIS) 5 MG tablet, Take by mouth. 1 Tablet Oral qd.  Dispense 1 box of Cialis for daily use., Disp: , Rfl:     traMADoL (ULTRAM) 50 mg tablet, Take 50 mg by mouth., Disp: , Rfl:     atorvastatin (LIPITOR) 20 MG tablet, Take 1 tablet (20 mg total) by mouth once daily., Disp: 90 tablet, Rfl: 1    metoprolol tartrate (LOPRESSOR) 25 MG tablet, Take 1 tablet (25 mg total) by mouth as needed. As needed for palpitations., Disp: 30 tablet, Rfl: 3  ALLERGIES: Review of patient's allergies indicates:  No Known Allergies    VITAL SIGNS: Ht 5' 10" (1.778 m)   Wt 81.6 kg (180 lb)   BMI 25.83 kg/m²      Review of Systems   Constitution: Negative. Negative for chills, fever and night sweats.   HENT: Negative for congestion and headaches.    Eyes: Negative for blurred vision, left vision loss and right vision loss.   Cardiovascular: Negative for chest pain and syncope.   Respiratory: Negative for cough and shortness of breath.    Endocrine: Negative for polydipsia, polyphagia and polyuria.   Hematologic/Lymphatic: Negative for bleeding problem. Does not bruise/bleed easily.   Skin: Negative for dry skin, itching and rash.   Musculoskeletal: Negative for falls and muscle weakness.   Gastrointestinal: Negative for abdominal pain and bowel incontinence.   Genitourinary: Negative for bladder incontinence and nocturia.   Neurological: Negative for disturbances in coordination, loss of balance and seizures.   Psychiatric/Behavioral: Negative for depression. The patient does not have insomnia.    Allergic/Immunologic: Negative for hives and persistent infections.       PHYSICAL EXAMINATION:  General:  The patient is alert and oriented x 3.  Mood is pleasant.  Observation of ears, eyes and nose reveal no gross abnormalities.  HEENT: NCAT, sclera nonicteric  Lungs: Respirations " are equal and unlabored.  Gait is coordinated. Patient can toe walk and heel walk without difficulty.  Cardiovascular: There are no swelling or varicosities present.   Lymphatic: Negative for adenopathy       left SHOULDER / UPPER EXTREMITY EXAM    OBSERVATION:     Swelling  none  Deformity  none   Discoloration  none   Scapular winging none   Scars   none  Atrophy  none    TENDERNESS / CREPITUS (T/C):          T/C      T/C   Clavicle   -/-  SUPRAspinatus    -/-   AC Jt.    -/-  INFRAspinatus  -/-   SC Jt.    -/-  Deltoid    -/-   G. Tuberosity  -/-  LH BICEP groove  +/-   Acromion:  -/-  Midline Neck   -/-   Scapular Spine -/-  Trapezium   -/-   SMA Scapula  -/-  GH jt. line - post  -/-   Scapulothoracic  -/-         ROM: (* = with pain)  Right shoulder   Left shoulder        AROM (PROM)   AROM (PROM)   FE    170° (175°)     170° (175°)     ER at 0°    60°  (65°)    60°  (65°)   ER at 90° ABD  90°  (90°)    90°  (90°)   IR at 90°  ABD   NA  (40°)     NA  (40°)      IR (spine level)   T10     T10    STRENGTH: (* = with pain) RIGHT SHOULDER  LEFT SHOULDER   SCAPTION at 0   5/5    *4+/5    SCAPTION at 30   5/5    5/5    IR    5/5    5/5   ER    5/5    5/5   BICEPS   5/5    5/5   Deltoid    5/5    5/5     SIGNS:  Painful side       NEER   neg    OCOLINS  Neg   EVANS   neg    SPEEDS  Neg   DROP ARM   neg   BELLY PRESS Neg   Superior escape none    LIFT-OFF  Neg   X-Body ADD    neg    MOVING VALGUS Neg      STABILITY TESTING    RIGHT SHOULDER   LEFT SHOULDER       Translation    Anterior  up face     up face    Posterior  up face    up face    Sulcus   < 10mm    < 10 mm    Signs    Apprehension   neg      Neg    Relocation   no change     no change    Jerk test  neg     Neg      EXTREMITY NEURO-VASCULAR EXAM    Sensation grossly intact to light touch all dermatomal regions.    DTR 2+ Biceps, Triceps, BR and Negative Farrahs sign   Grossly intact motor function at Elbow, Wrist and Hand   Distal pulses radial  and ulnar 2+, brisk cap refill, symmetric.      NECK:  Painless FROM and spinous processes non-tender. Negative Spurlings sign.      OTHER FINDINGS:    XRAYS:  Shoulder trauma series bilateral,  were reviewed and interpreted by me. No convincing fracture or dislocation is noted. The osseous structures appear well mineralized and well aligned    1. Shoulder pain, left     Plan:       ASSESSMENT:  shoulder pain.    I do think that this is likely a rotator cuff tear.    I have recommended we check an MRI of the shoulder to evaluate this.    Depending on the results of the MRI, we may consider a cortisone   injection and physical therapy and/or arthroscopic intervention and treatment   depending on what we find.  I will see him back upon its completion or PHREV and we will consider the above.

## 2022-12-17 ENCOUNTER — TELEPHONE (OUTPATIENT)
Dept: INTERNAL MEDICINE | Facility: CLINIC | Age: 67
End: 2022-12-17
Payer: MEDICARE

## 2022-12-17 NOTE — TELEPHONE ENCOUNTER
Please contact patient and inform him that his cholesterol is elevated.  I will need to increase the dose of his atorvastatin.  Please ask him what pharmacy he would like that sent to

## 2022-12-18 VITALS
HEIGHT: 70 IN | WEIGHT: 180.56 LBS | DIASTOLIC BLOOD PRESSURE: 88 MMHG | TEMPERATURE: 99 F | BODY MASS INDEX: 25.85 KG/M2 | HEART RATE: 71 BPM | SYSTOLIC BLOOD PRESSURE: 138 MMHG | OXYGEN SATURATION: 98 %

## 2022-12-18 NOTE — PROGRESS NOTES
Subjective:       Patient ID: Matthew Pisano is a 67 y.o. male.    Chief Complaint: Annual Exam    HPI  He is here for annual exam     Past medical history:  Hyperlipidemia, COVID, cervical spinal stenosis, family history of colon cancer, colon adenoma.  He had a colonoscopy April 2018     Medications:  Lipitor 20 mg daily     No known drug allergies       Review of Systems   Constitutional:  Negative for chills, fatigue, fever and unexpected weight change.   Respiratory:  Negative for chest tightness and shortness of breath.    Cardiovascular:  Negative for chest pain and palpitations.   Gastrointestinal:  Negative for abdominal pain and blood in stool.   Neurological:  Negative for dizziness, syncope, numbness and headaches.     Objective:      Physical Exam  HENT:      Right Ear: External ear normal.      Left Ear: External ear normal.      Nose: Nose normal.      Mouth/Throat:      Mouth: Mucous membranes are moist.      Pharynx: Oropharynx is clear.   Eyes:      Pupils: Pupils are equal, round, and reactive to light.   Cardiovascular:      Rate and Rhythm: Normal rate and regular rhythm.      Heart sounds: No murmur heard.  Pulmonary:      Breath sounds: Normal breath sounds.   Abdominal:      General: There is no distension.      Palpations: There is no hepatomegaly.      Tenderness: There is no abdominal tenderness.   Musculoskeletal:      Cervical back: Normal range of motion.   Lymphadenopathy:      Cervical: No cervical adenopathy.      Upper Body:      Right upper body: No axillary adenopathy.      Left upper body: No axillary adenopathy.   Neurological:      Cranial Nerves: No cranial nerve deficit.      Sensory: No sensory deficit.      Motor: Motor function is intact.      Deep Tendon Reflexes: Reflexes are normal and symmetric.       Assessment/Plan       Assessment and plan:  Annual exam.  Check CMP, lipid panel, CBC, PSA

## 2022-12-19 ENCOUNTER — TELEPHONE (OUTPATIENT)
Dept: INTERNAL MEDICINE | Facility: CLINIC | Age: 67
End: 2022-12-19
Payer: MEDICARE

## 2022-12-19 NOTE — TELEPHONE ENCOUNTER
Patient states he was breaking medication in half instead of taking the whole 20mg tablets. He will start back taking the whole tablet.

## 2022-12-21 ENCOUNTER — HOSPITAL ENCOUNTER (OUTPATIENT)
Dept: RADIOLOGY | Facility: HOSPITAL | Age: 67
Discharge: HOME | End: 2022-12-21
Attending: STUDENT IN AN ORGANIZED HEALTH CARE EDUCATION/TRAINING PROGRAM
Payer: MEDICARE

## 2022-12-21 DIAGNOSIS — M75.102 NONTRAUMATIC TEAR OF LEFT ROTATOR CUFF, UNSPECIFIED TEAR EXTENT: ICD-10-CM

## 2022-12-21 DIAGNOSIS — M25.519 SHOULDER PAIN, UNSPECIFIED CHRONICITY, UNSPECIFIED LATERALITY: ICD-10-CM

## 2022-12-21 PROCEDURE — 73221 MRI JOINT UPR EXTREM W/O DYE: CPT | Mod: 26,HCNC,LT, | Performed by: RADIOLOGY

## 2022-12-21 PROCEDURE — 73221 MRI SHOULDER WITHOUT CONTRAST LEFT: ICD-10-PCS | Mod: 26,HCNC,LT, | Performed by: RADIOLOGY

## 2022-12-21 PROCEDURE — 73221 MRI JOINT UPR EXTREM W/O DYE: CPT | Mod: TC,HCNC,LT

## 2022-12-22 ENCOUNTER — TELEPHONE (OUTPATIENT)
Dept: SPORTS MEDICINE | Facility: CLINIC | Age: 67
End: 2022-12-22
Payer: MEDICARE

## 2022-12-22 DIAGNOSIS — M25.512 LEFT SHOULDER PAIN, UNSPECIFIED CHRONICITY: Primary | ICD-10-CM

## 2023-01-05 ENCOUNTER — CLINICAL SUPPORT (OUTPATIENT)
Dept: REHABILITATION | Facility: HOSPITAL | Age: 68
End: 2023-01-05
Payer: MEDICARE

## 2023-01-05 DIAGNOSIS — M25.512 ACUTE PAIN OF LEFT SHOULDER: ICD-10-CM

## 2023-01-05 DIAGNOSIS — M25.512 LEFT SHOULDER PAIN, UNSPECIFIED CHRONICITY: ICD-10-CM

## 2023-01-05 PROCEDURE — 97110 THERAPEUTIC EXERCISES: CPT | Mod: HCNC | Performed by: PHYSICAL THERAPIST

## 2023-01-05 PROCEDURE — 97161 PT EVAL LOW COMPLEX 20 MIN: CPT | Mod: HCNC | Performed by: PHYSICAL THERAPIST

## 2023-01-06 NOTE — PLAN OF CARE
[facility]  Physical Therapy Initial Evaluation    Date: 1/5/2023   Name: Matthew Pisano  Clinic Number: [id number]    Therapy Diagnosis:   Encounter Diagnoses   Name Primary?    Left shoulder pain, unspecified chronicity     Acute pain of left shoulder      Physician: Omar Peck MD    Physician Orders: PT Eval and Treat   Medical Diagnosis from Referral: M25.512 (ICD-10-CM) - Left shoulder pain, unspecified chronicity  Evaluation Date: 1/5/2023  Authorization Period Expiration: 12/22/2023  Plan of Care Expiration: 4/5/2023  Visit # / Visits authorized: 1/ 1    Time In: 1600  Time Out: 1700  Total Appointment Time (timed & untimed codes): 60 minutes    Precautions: Standard    Subjective   Date of onset: November  History of current condition - Matthew reports: Patient reports his left shoulder began to hurt in November. He does not remember injuring it, but does report his left shoulder has always felt a little bit off. It is his non-dominant shoulder but tends to be stronger for him in the gym. 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 He underwent an MRI which revealed tendinosis      Medical History:   Past Medical History:   Diagnosis Date    Hyperlipidemia        Surgical History:   Matthew Pisano  has a past surgical history that includes Colonoscopy (N/A, 4/9/2018).    Medications:   Matthew has a current medication list which includes the following prescription(s): ascorbic acid (vitamin c), atorvastatin, b complex vitamins, coenzyme q10, metoprolol tartrate, multivitamin with minerals, nexium, omega-3 fatty acids-vitamin e, red yeast rice, sildenafil, tadalafil, and tramadol.    Allergies:   Review of patient's allergies indicates:  No Known Allergies     Imaging, MRI studies: FINDINGS:  Rotator cuff: There is mild increased signal at the posterior supraspinatus/ anterior infraspinatus  insertion, suggestive of mild tendinosis/low-grade partial tear.  There is also mild increased signal along bursal surface of the posterior infraspinatus, suggestive of tendinosis/fraying.  No discrete tear/tendon retraction.  Mild edema signal noted in the teres minor, probable grade 1 strain.  The subscapularis is unremarkable.  The glenohumeral alignment is within normal limits.     Small amount of subdeltoid fluid, suggestive of bursitis.     The biceps tendon demonstrates normal size, signal, and location.     Moderate AC joint arthropathy.  The acromion is flat, type 1.     Labrum: There is increased linear signal in posterosuperior labrum (series 2, images 11-12), probable partial tear, noting limited assessment without intra-articular contrast.  No labral displacement or paralabral cyst.     Cartilage: Generalized thinning.  No cartilage defects or subchondral marrow change.  No joint effusion.     No fractures or marrow replacement process.  No lymphadenopathy.    Prior Therapy: Vertigo  Social History: He lives with their family  Occupation: Restaurant owner  Prior Level of Function: Independent - exercises regularly  Current Level of Function: Independent with painful ADL and modified exercise     Pain:  Current 3/10, worst 6/10, best 2/10   Location: { left shoulder(s)  Description: Aching and Sharp  Aggravating Factors: Lifting  Easing Factors: rest    Pts goals: return to work and gym pain free    Objective     Observation: Patient is a 67 y.o. male presenting alert and oriented    Posture: Left scapula is elevated, anterior tilt with internal rotation. Lacks full upward rotation of the scapula with flexion    Passive Range of Motion:   Shoulder Right Left   Flexion 180 160   Abduction 180 160   ER at 0 50 50   ER at 90 90 90   IR 50 70      Active Range of Motion:   Shoulder Right Left   Flexion 180 160   Abduction 180 160   ER at 0 45 45   ER at 90 80 80   IR 45 60   Reach behind head C7 C7   Reach  behind back  T12 T12     Strength:  Shoulder Right Left   Flexion 5 4   Abduction 5 4-   ER 5 4   IR 5 4   Serratus Anterior 4 4-   Middle Trap 4 3+   Low Trap 4 3       Special Tests:  Impingement Cluster:   Right Left   [place] - +   Painful Arc - +   Resisted ER - +       Joint Mobility: Increased total arc of motion on the L with subscap weakness. He lacks upward rotation of the left scapula, +scap assistance test    Palpation: tender over infraspinatus    Sensation: intact    Flexibility:   Lat: R - ; L -   Pec Minor: R short ; L short       Limitation/Restriction for FOTO Shoulder Survey    Therapist reviewed FOTO scores for Matthew Pisano on 1/5/2023.   FOTO documents entered into [employer] - see Media section.    Limitation Score: %         TREATMENT   Treatment Time In: 1630  Treatment Time Out: 1640  Total Treatment time (time-based codes) separate from Evaluation: 10 minutes    Matthew received therapeutic exercises to develop strength and endurance for 10 minutes including:    Prone mid trap bent elbow 2 x 10  Serratus wall slide GTB 10x  HEP review      Home Exercises and Patient Education Provided    Education provided:   - Improve serratus and low trap strength    Written Home Exercises Provided: yes.  Exercises were reviewed and patient was able to demonstrate them prior to the end of the session.  Patient demonstrated good  understanding of the education provided.     See EMR under Patient Instructions for exercisesprovided 1/5/2023.    Assessment   Matthew is a 67 y.o. male referred to outpatient Physical Therapy with a medical diagnosis of left shoulder pain. Pt presents with limited scapular upward rotation, serratus and low trap strength deficits, poor scapular positioning, trouble completing ADL independently, and pain with working out in the gym.     Pt prognosis is Excellent.   Pt will benefit from skilled outpatient Physical Therapy to address the deficits stated above and in the chart  below, provide pt/family education, and to maximize pt's level of independence.     Plan of care discussed with patient: Yes  Pt's spiritual, cultural and educational needs considered and patient is agreeable to the plan of care and goals as stated below:     Anticipated Barriers for therapy: work schedule     Medical Necessity is demonstrated by the following  History  Co-morbidities and personal factors that may impact the plan of care Co-morbidities:   level of undertstanding of current condition    Personal Factors:   no deficits     low   Examination  Body Structures and Functions, activity limitations and participation restrictions that may impact the plan of care Body Regions:   upper extremities    Body Systems:    ROM  strength  transfers  transitions  motor control  motor learning    Participation Restrictions:   covid-19    Activity limitations:   Learning and applying knowledge  no deficits    General Tasks and Commands  no deficits    Communication  no deficits    Mobility  lifting and carrying objects  fine hand use (grasping/picking up)    Self care  no deficits    Domestic Life  no deficits    Interactions/Relationships  no deficits    Life Areas  no deficits    Community and Social Life  no deficits         moderate   Clinical Presentation stable and uncomplicated low   Decision Making/ Complexity Score: low     GOALS: Short Term Goals:  3 weeks  1.Report decreased shoulder pain < / =  2/10  to increase tolerance for return to work without pain  2. Increase PROM full motion without shoulder pain    3. Increased strength by 1/3 MMT grade in serratus and low trap to increase tolerance for ADL and work activities.  4. Pt to tolerate HEP to improve ROM and independence with ADL's    Long Term Goals: 6 weeks  1.Report decreased shoulder pain  < / =  0 /10  to increase tolerance for return to gym pain free  2.Increase AROM to full overhead flexion with proper upward rotation of scapula  3.Increase  strength to >/= 4/5 in serratus and low trap to increase tolerance for ADL and work activities.  4. Pt goal: return to the gym pain free  5. Pt will have improved gcode of CJ (20-40% limited) on FOTO shoulder in order to demonstrate true functional improvement.     Plan   Plan of care Certification: 1/5/2023 to 4/5/2023.    Outpatient Physical Therapy 2 times weekly for 10 weeks to include the following interventions: Manual Therapy, Moist Heat/ Ice, Neuromuscular Re-ed, Patient Education, Self Care, Therapeutic Activities, and Therapeutic Exercise.     Derik Panchal, PT, DPT, OCS

## 2023-01-30 ENCOUNTER — CLINICAL SUPPORT (OUTPATIENT)
Dept: REHABILITATION | Facility: HOSPITAL | Age: 68
End: 2023-01-30
Payer: MEDICARE

## 2023-01-30 DIAGNOSIS — M25.512 ACUTE PAIN OF LEFT SHOULDER: Primary | ICD-10-CM

## 2023-01-30 PROCEDURE — 97140 MANUAL THERAPY 1/> REGIONS: CPT | Mod: HCNC

## 2023-01-30 PROCEDURE — 97112 NEUROMUSCULAR REEDUCATION: CPT | Mod: HCNC

## 2023-01-30 PROCEDURE — 97110 THERAPEUTIC EXERCISES: CPT | Mod: HCNC

## 2023-01-30 NOTE — PROGRESS NOTES
"  Physical Therapy Daily Treatment Note     Name: Matthew Pisano  Clinic Number: [id number]    Therapy Diagnosis:   Encounter Diagnosis   Name Primary?    Acute pain of left shoulder Yes     Physician: Omar Peck MD    Visit Date: 1/30/2023  Physician Orders: PT Eval and Treat   Medical Diagnosis from Referral: M25.512 (ICD-10-CM) - Left shoulder pain, unspecified chronicity  Evaluation Date: 1/5/2023  Authorization Period Expiration: 12/22/2023  Plan of Care Expiration: 4/5/2023  Visit # / Visits authorized: 1/ 1    Time In: 1300  Time Out: 1400  Total Billable Time: 60 minutes    Precautions: Standard    Subjective     Pt reports: I did my exercises the past month, missed 2 days for travel to [place].    He was compliant with home exercise program.  Response to previous treatment: less pain in shoulder  Functional change: less pain with ADL    Pain: 0/10  Location: left shoulder      Objective     Observation: scapula winging on left with elevation, resisted testing. Cervical flex with overhead motions. Shoulder elevation with flex.     Scpaular assistance testing: decreased pain with posterior tilt and retraction  Resisted testing: ER 3+/5 on left      Matthew received therapeutic exercises to develop strength, endurance, ROM, flexibility, posture, and core stabilization for 15 minutes including:  Prone y on ball 2x10  - pain in anterior/lateral shoulder  Prone mid trap level 2 2x10x3"    Matthew received the following manual therapy techniques: Joint mobilizations and Soft tissue Mobilization were applied to the: L Shoulder for 10 minutes, including:  Shoulder assessment    Matthew participated in neuromuscular re-education activities to improve: Coordination, Sense, Proprioception, and Posture for 30 minutes. The following activities were included:  Body blade 3x30" ER/IR at 0, 90 degrees flex  ER walkouts with elevation green TB 3 rounds to burn  IR walkouts with elevation blue TB 2 rounds to " "burn  Push up plus at wall 3x10  No money green TB 2x10x5"    Matthew participated in dynamic functional therapeutic activities to improve functional performance for 0  minutes, including:    Home Exercises Provided and Patient Education Provided     Education provided:   - Progressed rotator cuff strength in pain free range    Written Home Exercises Provided: Patient instructed to cont prior HEP.  Exercises were reviewed and Matthew was able to demonstrate them prior to the end of the session.  Matthew demonstrated good  understanding of the education provided.     See EMR under Patient Instructions for exercises provided prior visit.    Assessment     Matthew still presented with scapular winging on left. Progressed with cuff activation with 90/90 overhead, no money, body blade, and push-up plus at wall. Still having difficulty with scpaular winging and hypermobility of scapula with overhead motion. Updated HEP and educated on minimizing overhead motions.     Matthew Is progressing well towards his goals.   Pt prognosis is Excellent.     Pt will continue to benefit from skilled outpatient physical therapy to address the deficits listed in the problem list box on initial evaluation, provide pt/family education and to maximize pt's level of independence in the home and community environment.     Pt's spiritual, cultural and educational needs considered and pt agreeable to plan of care and goals.    Anticipated barriers to physical therapy: work schedule    GOALS: Short Term Goals:  3 weeks(Progressing, not met)  1.Report decreased shoulder pain < / =  2/10  to increase tolerance for return to work without pain  2. Increase PROM full motion without shoulder pain    3. Increased strength by 1/3 MMT grade in serratus and low trap to increase tolerance for ADL and work activities.  4. Pt to tolerate HEP to improve ROM and independence with ADL's     Long Term Goals: 6 weeks(Progressing, not met)  1.Report decreased " shoulder pain  < / =  0 /10  to increase tolerance for return to gym pain free  2.Increase AROM to full overhead flexion with proper upward rotation of scapula  3.Increase strength to >/= 4/5 in serratus and low trap to increase tolerance for ADL and work activities.  4. Pt goal: return to the gym pain free  5. Pt will have improved gcode of CJ (20-40% limited) on FOTO shoulder in order to demonstrate true functional improvement.     Plan     Plan of care Certification: 1/5/2023 to 4/5/2023.    Improve left shoulder motion and strength    Derik Panchal, PT

## 2023-03-03 ENCOUNTER — TELEPHONE (OUTPATIENT)
Dept: INTERNAL MEDICINE | Facility: CLINIC | Age: 68
End: 2023-03-03
Payer: MEDICARE

## 2023-03-03 NOTE — TELEPHONE ENCOUNTER
Called and spoke to patient about crampy stomach aches. Some diarrhea watery and loose. Has been taking pepto bismal which has been making it black at times.Not sure if it not colitis.Family hx of pancreatic cancer so some concern.Encouraged patient to go to UC or ED for evaluation.Would like something for his stomach.

## 2023-03-06 ENCOUNTER — TELEPHONE (OUTPATIENT)
Dept: INTERNAL MEDICINE | Facility: CLINIC | Age: 68
End: 2023-03-06
Payer: MEDICARE

## 2023-03-06 NOTE — TELEPHONE ENCOUNTER
Called and spoke to patient about symptoms and scheduling an appt with PCP or available provider. Patient stated that symptoms of diarrhea much better. Patient states he really believes its just nerves. Patient states he will make an appt or go to ER/UC if need be.

## 2023-06-02 ENCOUNTER — TELEPHONE (OUTPATIENT)
Dept: INTERNAL MEDICINE | Facility: CLINIC | Age: 68
End: 2023-06-02
Payer: MEDICARE

## 2023-06-02 NOTE — TELEPHONE ENCOUNTER
----- Message from Elma Jackson sent at 6/2/2023 12:22 PM CDT -----  Contact: [phone]  Consult    The patient is requesting and order for a repeat PSA test and he wants to know how long he should wait between the 1st one which was elevated on 5/23/23    Thank you

## 2023-06-02 NOTE — TELEPHONE ENCOUNTER
It looks like the urologist he saw at [facility] put in an order

## 2023-06-05 ENCOUNTER — TELEPHONE (OUTPATIENT)
Dept: INTERNAL MEDICINE | Facility: CLINIC | Age: 68
End: 2023-06-05
Payer: MEDICARE

## 2023-06-05 DIAGNOSIS — R97.20 ELEVATED PSA: Primary | ICD-10-CM

## 2023-06-05 NOTE — TELEPHONE ENCOUNTER
----- Message from Elma Manuel sent at 6/5/2023  3:53 PM CDT -----  Contact: Pt [phone]  His Non Urolgist is leaving so he can't do a biopsy of his prostate in timely manner therefore, he want you to put in a referral to another [facility].     Thank you

## 2023-06-07 ENCOUNTER — LAB VISIT (OUTPATIENT)
Dept: LAB | Facility: HOSPITAL | Age: 68
End: 2023-06-07
Attending: UROLOGY
Payer: MEDICARE

## 2023-06-07 ENCOUNTER — OFFICE VISIT (OUTPATIENT)
Dept: UROLOGY | Facility: CLINIC | Age: 68
End: 2023-06-07
Payer: MEDICARE

## 2023-06-07 VITALS
WEIGHT: 172 LBS | BODY MASS INDEX: 24.62 KG/M2 | DIASTOLIC BLOOD PRESSURE: 71 MMHG | SYSTOLIC BLOOD PRESSURE: 138 MMHG | HEART RATE: 61 BPM | HEIGHT: 70 IN

## 2023-06-07 DIAGNOSIS — R97.20 ELEVATED PSA: ICD-10-CM

## 2023-06-07 LAB
CREAT SERPL-MCNC: 1.1 MG/DL (ref 0.5–1.4)
EST. GFR  (NO RACE VARIABLE): >60 ML/MIN/1.73 M^2

## 2023-06-07 PROCEDURE — 3075F SYST BP GE 130 - 139MM HG: CPT | Mod: CPTII,S$GLB,, | Performed by: UROLOGY

## 2023-06-07 PROCEDURE — 3008F PR BODY MASS INDEX (BMI) DOCUMENTED: ICD-10-PCS | Mod: CPTII,S$GLB,, | Performed by: UROLOGY

## 2023-06-07 PROCEDURE — 1159F PR MEDICATION LIST DOCUMENTED IN MEDICAL RECORD: ICD-10-PCS | Mod: CPTII,S$GLB,, | Performed by: UROLOGY

## 2023-06-07 PROCEDURE — 36415 COLL VENOUS BLD VENIPUNCTURE: CPT | Performed by: UROLOGY

## 2023-06-07 PROCEDURE — 3288F FALL RISK ASSESSMENT DOCD: CPT | Mod: CPTII,S$GLB,, | Performed by: UROLOGY

## 2023-06-07 PROCEDURE — 3078F PR MOST RECENT DIASTOLIC BLOOD PRESSURE < 80 MM HG: ICD-10-PCS | Mod: CPTII,S$GLB,, | Performed by: UROLOGY

## 2023-06-07 PROCEDURE — 1160F RVW MEDS BY RX/DR IN RCRD: CPT | Mod: CPTII,S$GLB,, | Performed by: UROLOGY

## 2023-06-07 PROCEDURE — 3288F PR FALLS RISK ASSESSMENT DOCUMENTED: ICD-10-PCS | Mod: CPTII,S$GLB,, | Performed by: UROLOGY

## 2023-06-07 PROCEDURE — 3078F DIAST BP <80 MM HG: CPT | Mod: CPTII,S$GLB,, | Performed by: UROLOGY

## 2023-06-07 PROCEDURE — 3075F PR MOST RECENT SYSTOLIC BLOOD PRESS GE 130-139MM HG: ICD-10-PCS | Mod: CPTII,S$GLB,, | Performed by: UROLOGY

## 2023-06-07 PROCEDURE — 1126F PR PAIN SEVERITY QUANTIFIED, NO PAIN PRESENT: ICD-10-PCS | Mod: CPTII,S$GLB,, | Performed by: UROLOGY

## 2023-06-07 PROCEDURE — 1101F PR PT FALLS ASSESS DOC 0-1 FALLS W/OUT INJ PAST YR: ICD-10-PCS | Mod: CPTII,S$GLB,, | Performed by: UROLOGY

## 2023-06-07 PROCEDURE — 82565 ASSAY OF CREATININE: CPT | Performed by: UROLOGY

## 2023-06-07 PROCEDURE — 99999 PR PBB SHADOW E&M-EST. PATIENT-LVL III: ICD-10-PCS | Mod: PBBFAC,,, | Performed by: UROLOGY

## 2023-06-07 PROCEDURE — 99204 PR OFFICE/OUTPT VISIT, NEW, LEVL IV, 45-59 MIN: ICD-10-PCS | Mod: S$GLB,,, | Performed by: UROLOGY

## 2023-06-07 PROCEDURE — 3008F BODY MASS INDEX DOCD: CPT | Mod: CPTII,S$GLB,, | Performed by: UROLOGY

## 2023-06-07 PROCEDURE — 1101F PT FALLS ASSESS-DOCD LE1/YR: CPT | Mod: CPTII,S$GLB,, | Performed by: UROLOGY

## 2023-06-07 PROCEDURE — 1160F PR REVIEW ALL MEDS BY PRESCRIBER/CLIN PHARMACIST DOCUMENTED: ICD-10-PCS | Mod: CPTII,S$GLB,, | Performed by: UROLOGY

## 2023-06-07 PROCEDURE — 99999 PR PBB SHADOW E&M-EST. PATIENT-LVL III: CPT | Mod: PBBFAC,,, | Performed by: UROLOGY

## 2023-06-07 PROCEDURE — 1126F AMNT PAIN NOTED NONE PRSNT: CPT | Mod: CPTII,S$GLB,, | Performed by: UROLOGY

## 2023-06-07 PROCEDURE — 99204 OFFICE O/P NEW MOD 45 MIN: CPT | Mod: S$GLB,,, | Performed by: UROLOGY

## 2023-06-07 PROCEDURE — 1159F MED LIST DOCD IN RCRD: CPT | Mod: CPTII,S$GLB,, | Performed by: UROLOGY

## 2023-06-07 NOTE — PROGRESS NOTES
Subjective:       Patient ID: Matthew Pisano is a 67 y.o. male.    Chief Complaint: Elevated PSA    HPI patient is here with a elevated PSA of 5.86.  He would like to have an MRI prior to biopsy if necessary.  He is voiding well and has a negative family history of prostate cancer    Past Medical History:   Diagnosis Date    Hyperlipidemia        Past Surgical History:   Procedure Laterality Date    COLONOSCOPY N/A 4/9/2018    Procedure: COLONOSCOPY;  Surgeon: Tone Emmanuel MD;  Location: [address];  Service: Endoscopy;  Laterality: N/A;       Family History   Problem Relation Age of Onset    Bladder Cancer Father     Pancreatic cancer Brother     Melanoma Neg Hx     Psoriasis Neg Hx     Lupus Neg Hx        Social History     Socioeconomic History    Marital status: Single   Occupational History    Occupation: Restaurant     Employer: self   Tobacco Use    Smoking status: Never    Smokeless tobacco: Never   Substance and Sexual Activity    Alcohol use: Yes     Comment: Social    Drug use: No       Allergies:  Patient has no known allergies.    Medications:    Current Outpatient Medications:     ascorbic acid, vitamin C, (VITAMIN C) 500 MG tablet, Take by mouth. 1 Tablet Oral Every day, Disp: , Rfl:     b complex vitamins tablet, 1 Tablet Oral Every day, Disp: , Rfl:     multivitamin with minerals tablet, Take 1 tablet by mouth once daily., Disp: , Rfl:     sildenafiL (VIAGRA) 50 MG tablet, 1 Tablet Oral Every day prn, Disp: , Rfl:     tadalafiL (CIALIS) 5 MG tablet, Take by mouth. 1 Tablet Oral qd.  Dispense 1 box of Cialis for daily use., Disp: , Rfl:     atorvastatin (LIPITOR) 20 MG tablet, Take 1 tablet (20 mg total) by mouth once daily., Disp: 90 tablet, Rfl: 1    metoprolol tartrate (LOPRESSOR) 25 MG tablet, Take 1 tablet (25 mg total) by mouth as needed. As needed for palpitations., Disp: 30 tablet, Rfl: 3    Review of Systems   Constitutional:  Negative for activity change, appetite change,  chills, diaphoresis, fatigue, fever and unexpected weight change.   HENT:  Negative for congestion, dental problem, hearing loss, mouth sores, postnasal drip, rhinorrhea, sinus pressure and trouble swallowing.    Eyes:  Negative for pain, discharge and itching.   Respiratory:  Negative for apnea, cough, choking, chest tightness, shortness of breath and wheezing.    Cardiovascular:  Negative for chest pain, palpitations and leg swelling.   Gastrointestinal:  Negative for abdominal distention, abdominal pain, anal bleeding, blood in stool, constipation, diarrhea, nausea, rectal pain and vomiting.   Endocrine: Negative for polydipsia and polyuria.   Genitourinary:  Negative for decreased urine volume, difficulty urinating, dysuria, enuresis, flank pain, frequency, genital sores, hematuria, penile discharge, penile pain, penile swelling, scrotal swelling, testicular pain and urgency.   Musculoskeletal:  Negative for arthralgias, back pain and myalgias.   Skin:  Negative for color change, rash and wound.   Neurological:  Negative for dizziness, syncope, speech difficulty, light-headedness and headaches.   Hematological:  Negative for adenopathy. Does not bruise/bleed easily.   Psychiatric/Behavioral:  Negative for behavioral problems, confusion, hallucinations and sleep disturbance.      Objective:      Physical Exam  Constitutional:       Appearance: He is well-developed.   HENT:      Head: Normocephalic.   Cardiovascular:      Rate and Rhythm: Normal rate.   Pulmonary:      Effort: Pulmonary effort is normal.   Abdominal:      Palpations: Abdomen is soft.   Genitourinary:     Comments: Benign prostate exam by Dr. Melgar 2 weeks ago so I did not repeat  Skin:     General: Skin is warm.   Neurological:      Mental Status: He is alert.       Assessment:       1. Elevated PSA        Plan:       Matthew was seen today for elevated psa.    Diagnoses and all orders for this visit:    Elevated PSA  -     Ambulatory  referral/consult to Urology  -     MRI Prostate W W/O Contrast; Future  -     Creatinine, Serum; Future

## 2023-06-08 ENCOUNTER — TELEPHONE (OUTPATIENT)
Dept: UROLOGY | Facility: CLINIC | Age: 68
End: 2023-06-08
Payer: MEDICARE

## 2023-06-08 NOTE — TELEPHONE ENCOUNTER
"----- Message from Casandra Pisano sent at 6/8/2023 11:34 AM CDT -----  Regarding: Medication Question  "Type:  Patient Call Back    Who Called:PT    What is the reqeust in detail:Pt requesting call back has some questions in regards to his medication. Please advise    Can the clinic reply by [facility]?no    Best Call Back Number:[phone]      Additional Information:            "

## 2023-08-11 ENCOUNTER — PES CALL (OUTPATIENT)
Dept: ADMINISTRATIVE | Facility: CLINIC | Age: 68
End: 2023-08-11
Payer: MEDICARE

## 2023-09-08 ENCOUNTER — TELEPHONE (OUTPATIENT)
Dept: INTERNAL MEDICINE | Facility: CLINIC | Age: 68
End: 2023-09-08
Payer: MEDICARE

## 2023-09-08 ENCOUNTER — OFFICE VISIT (OUTPATIENT)
Dept: DERMATOLOGY | Facility: CLINIC | Age: 68
End: 2023-09-08
Payer: MEDICARE

## 2023-09-08 DIAGNOSIS — R21 RASH: Primary | ICD-10-CM

## 2023-09-08 DIAGNOSIS — L74.3 MILIARIA: ICD-10-CM

## 2023-09-08 DIAGNOSIS — L50.3 DERMATOGRAPHISM: ICD-10-CM

## 2023-09-08 DIAGNOSIS — L82.0 SEBORRHEIC KERATOSES, INFLAMED: Primary | ICD-10-CM

## 2023-09-08 PROCEDURE — 99203 PR OFFICE/OUTPT VISIT, NEW, LEVL III, 30-44 MIN: ICD-10-PCS | Mod: 25,HCNC,S$GLB, | Performed by: STUDENT IN AN ORGANIZED HEALTH CARE EDUCATION/TRAINING PROGRAM

## 2023-09-08 PROCEDURE — 1159F PR MEDICATION LIST DOCUMENTED IN MEDICAL RECORD: ICD-10-PCS | Mod: HCNC,CPTII,S$GLB, | Performed by: STUDENT IN AN ORGANIZED HEALTH CARE EDUCATION/TRAINING PROGRAM

## 2023-09-08 PROCEDURE — 1126F AMNT PAIN NOTED NONE PRSNT: CPT | Mod: HCNC,CPTII,S$GLB, | Performed by: STUDENT IN AN ORGANIZED HEALTH CARE EDUCATION/TRAINING PROGRAM

## 2023-09-08 PROCEDURE — 1126F PR PAIN SEVERITY QUANTIFIED, NO PAIN PRESENT: ICD-10-PCS | Mod: HCNC,CPTII,S$GLB, | Performed by: STUDENT IN AN ORGANIZED HEALTH CARE EDUCATION/TRAINING PROGRAM

## 2023-09-08 PROCEDURE — 99203 OFFICE O/P NEW LOW 30 MIN: CPT | Mod: 25,HCNC,S$GLB, | Performed by: STUDENT IN AN ORGANIZED HEALTH CARE EDUCATION/TRAINING PROGRAM

## 2023-09-08 PROCEDURE — 1159F MED LIST DOCD IN RCRD: CPT | Mod: HCNC,CPTII,S$GLB, | Performed by: STUDENT IN AN ORGANIZED HEALTH CARE EDUCATION/TRAINING PROGRAM

## 2023-09-08 PROCEDURE — 1160F PR REVIEW ALL MEDS BY PRESCRIBER/CLIN PHARMACIST DOCUMENTED: ICD-10-PCS | Mod: HCNC,CPTII,S$GLB, | Performed by: STUDENT IN AN ORGANIZED HEALTH CARE EDUCATION/TRAINING PROGRAM

## 2023-09-08 PROCEDURE — 17110 DESTRUCTION B9 LES UP TO 14: CPT | Mod: HCNC,S$GLB,, | Performed by: STUDENT IN AN ORGANIZED HEALTH CARE EDUCATION/TRAINING PROGRAM

## 2023-09-08 PROCEDURE — 1160F RVW MEDS BY RX/DR IN RCRD: CPT | Mod: HCNC,CPTII,S$GLB, | Performed by: STUDENT IN AN ORGANIZED HEALTH CARE EDUCATION/TRAINING PROGRAM

## 2023-09-08 PROCEDURE — 17110 PR DESTRUCTION BENIGN LESIONS UP TO 14: ICD-10-PCS | Mod: HCNC,S$GLB,, | Performed by: STUDENT IN AN ORGANIZED HEALTH CARE EDUCATION/TRAINING PROGRAM

## 2023-09-08 PROCEDURE — 99999 PR PBB SHADOW E&M-EST. PATIENT-LVL III: CPT | Mod: PBBFAC,HCNC,, | Performed by: STUDENT IN AN ORGANIZED HEALTH CARE EDUCATION/TRAINING PROGRAM

## 2023-09-08 PROCEDURE — 99999 PR PBB SHADOW E&M-EST. PATIENT-LVL III: ICD-10-PCS | Mod: PBBFAC,HCNC,, | Performed by: STUDENT IN AN ORGANIZED HEALTH CARE EDUCATION/TRAINING PROGRAM

## 2023-09-08 RX ORDER — TRIAMCINOLONE ACETONIDE 1 MG/G
CREAM TOPICAL 2 TIMES DAILY
Qty: 454 G | Refills: 1 | Status: SHIPPED | OUTPATIENT
Start: 2023-09-08

## 2023-09-08 NOTE — TELEPHONE ENCOUNTER
Order in    I put it in as external because I don't see an appt in our system- please ask him if that is correct

## 2023-09-08 NOTE — TELEPHONE ENCOUNTER
----- Message from Zenaida Emery sent at 9/8/2023  8:34 AM CDT -----  Contact: [phone]  1MEDICALADVICE     Patient is calling for Medical Advice regarding:pt needs to see a dermatologist     How long has patient had these symptoms: he states today     Pharmacy name and phone#:   [facility] [place] - [address]  [address] [place] [place] [address]  Phone: [phone] Fax: [phone]         Would like response via [employer]:  no     Comments:pt is calling for a referral to see dermatology please give return call he is truing for an appt today

## 2023-09-08 NOTE — PATIENT INSTRUCTIONS

## 2023-09-08 NOTE — PROGRESS NOTES
Subjective:      Patient ID:  Matthew Pisano is a 68 y.o. male who presents for   Chief Complaint   Patient presents with    Rash     Both arms, legs, chest, neck     Rash - Initial  Affected locations: right arm, left arm, right upper leg, left upper leg, chest and neck  Duration: 1 week  Signs / symptoms: itching and redness  Aggravated by: friction  Relieving factors/Treatments tried: OTC hydrocortisone  Improvement on treatment: mild      Started on arms then spread to thighs, trunk and neck. Some new areas have appeared and some have resolved. A/w itching    Took pred which did not help. Benadryl helped a bit to sleep    No new medications in the last 3 months    On testosterone replacement    Review of Systems   Skin:  Positive for itching and rash.       Objective:   Physical Exam   Constitutional: He appears well-developed and well-nourished. No distress.   Neurological: He is alert and oriented to person, place, and time. He is not disoriented.   Psychiatric: He has a normal mood and affect.   Skin:   Areas Examined (abnormalities noted in diagram):   Neck Inspection Performed  Chest / Axilla Inspection Performed  RUE Inspected  LUE Inspection Performed  RLE Inspected  LLE Inspection Performed                Diagram Legend     Erythematous scaling macule/papule c/w actinic keratosis       Vascular papule c/w angioma      Pigmented verrucoid papule/plaque c/w seborrheic keratosis      Yellow umbilicated papule c/w sebaceous hyperplasia      Irregularly shaped tan macule c/w lentigo     1-2 mm smooth white papules consistent with Milia      Movable subcutaneous cyst with punctum c/w epidermal inclusion cyst      Subcutaneous movable cyst c/w pilar cyst      Firm pink to brown papule c/w dermatofibroma      Pedunculated fleshy papule(s) c/w skin tag(s)      Evenly pigmented macule c/w junctional nevus     Mildly variegated pigmented, slightly irregular-bordered macule c/w mildly atypical nevus      Flesh  colored to evenly pigmented papule c/w intradermal nevus       Pink pearly papule/plaque c/w basal cell carcinoma      Erythematous hyperkeratotic cursted plaque c/w SCC      Surgical scar with no sign of skin cancer recurrence      Open and closed comedones      Inflammatory papules and pustules      Verrucoid papule consistent consistent with wart     Erythematous eczematous patches and plaques     Dystrophic onycholytic nail with subungual debris c/w onychomycosis     Umbilicated papule    Erythematous-base heme-crusted tan verrucoid plaque consistent with inflamed seborrheic keratosis     Erythematous Silvery Scaling Plaque c/w Psoriasis     See annotation      Assessment / Plan:        Seborrheic keratoses, inflamed  Lesion was painful and catching on necklace    Cryosurgery procedure note:    Verbal consent from the patient is obtained including, but not limited to, risk of hypopigmentation/hyperpigmentation, scar, recurrence of lesion. Liquid nitrogen cryosurgery is applied to 1 lesions to produce a freeze injury. The patient is aware that blisters may form and is instructed on wound care with gentle cleansing and use of vaseline ointment to keep moist until healed. The patient is supplied a handout on cryosurgery and is instructed to call if lesions do not completely resolve.    Miliaria  - acute onset 1 week of erythematous 3-4 mm macules / papules with central erosion / vesicle. Favor miliaria. Ddx could including scabies, viral exanthem, folliculitis, varicella. Patient had has chicken pox as a kid and was feeling well. No pustules on exam.   - mineral oil scraping negative  -     triamcinolone acetonide 0.1% (KENALOG) 0.1 % cream; Apply topically 2 (two) times daily. Use to affected areas for up to 2 weeks then take a 1 week break or decrease to 3 times weekly. Do not apply to groin or face  Dispense: 454 g; Refill: 1    Dermatographism  - OTC zyrtec or allegra 24 hr. Start 1-2 daily and titrate up to 2  bid if not drowsy and still with pruritus             Follow up in about 2 weeks (around 9/22/2023).

## 2023-09-21 ENCOUNTER — TELEPHONE (OUTPATIENT)
Dept: DERMATOLOGY | Facility: CLINIC | Age: 68
End: 2023-09-21
Payer: MEDICARE

## 2023-09-21 NOTE — TELEPHONE ENCOUNTER
----- Message from Laverne Merrill sent at 9/21/2023  2:48 PM CDT -----  Regarding: apt  Contact: [phone]  Pt calling in regarding cancel apt on 9/20/23 also pt stated he is getting  blood work done and needing to know if Dr is needing order for blood work well please call to discus Further

## 2023-09-21 NOTE — TELEPHONE ENCOUNTER
----- Message from Cassie Hilario sent at 9/21/2023  9:19 AM CDT -----  Contact: [phone]  Pt had to cancel his appt on yesterday. Pt is requesting a callback in regards to scheduling a sooner appt. The next available in person appt is 01/22/24. There are no appt times listed for a virtual appt. Please call to schedule.              Thank you

## 2023-10-24 ENCOUNTER — TELEPHONE (OUTPATIENT)
Dept: INTERNAL MEDICINE | Facility: CLINIC | Age: 68
End: 2023-10-24
Payer: MEDICARE

## 2023-10-25 NOTE — TELEPHONE ENCOUNTER
Pt stated that he saw his urologist and they told him to follow up with you. Pt stated that he wasn't sure if it was urgent or not

## 2023-10-25 NOTE — TELEPHONE ENCOUNTER
----- Message from Anita Garsia MA sent at 10/24/2023  2:29 PM CDT -----  Contact: [phone]    ----- Message -----  From: La Brown  Sent: 10/24/2023  12:38 PM CDT  To: Nathaly SANCHEZ Staff    Caller is requesting an earlier appointment then we can schedule.  Caller is requesting a message be sent to the provider.  If this is for urgent care symptoms, did you offer other providers at this location, providers at other locations, or [facility]? (yes, no, n/a):  n/a  If this is for the patients physical, did you offer to schedule next available and put on wait list, or to see NP or PA for their physical?  (yes, no, n/a): n/a   When is the next available appointment with their provider:  01/12/2023  Reason for the appointment:  full check up due to prostate screening  Patient preference of timeframe to be scheduled:  asap   Would the patient like a call back, or a response through their [facility] portal?:   call back   Comments:

## 2023-11-01 ENCOUNTER — TELEPHONE (OUTPATIENT)
Dept: INTERNAL MEDICINE | Facility: CLINIC | Age: 68
End: 2023-11-01
Payer: MEDICARE

## 2023-11-01 NOTE — TELEPHONE ENCOUNTER
Please ask if he can come in to see me 11-3-23 at 10 AM. Will need to be an override.    If he feels he needs to be seen before then, please advise him to go to Urgent Care

## 2023-11-01 NOTE — TELEPHONE ENCOUNTER
----- Message from Tamra Jaimes sent at 11/1/2023  9:19 AM CDT -----  Contact: [phone]  1MEDICALADVICE     Patient is calling for Medical Advice regarding: pt states he overslept as he came down with something last night. He missed appt and would like to know if he can still come in today. Please advise.     How long has patient had these symptoms:    Pharmacy name and phone#:    Would like response via [employer]:  no    Comments:

## 2023-11-03 ENCOUNTER — OFFICE VISIT (OUTPATIENT)
Dept: INTERNAL MEDICINE | Facility: CLINIC | Age: 68
End: 2023-11-03
Payer: MEDICARE

## 2023-11-03 ENCOUNTER — LAB VISIT (OUTPATIENT)
Dept: LAB | Facility: HOSPITAL | Age: 68
End: 2023-11-03
Attending: INTERNAL MEDICINE
Payer: MEDICARE

## 2023-11-03 DIAGNOSIS — Z00.00 PREVENTATIVE HEALTH CARE: ICD-10-CM

## 2023-11-03 DIAGNOSIS — E78.5 HYPERLIPIDEMIA, UNSPECIFIED HYPERLIPIDEMIA TYPE: ICD-10-CM

## 2023-11-03 DIAGNOSIS — K63.5 POLYP OF COLON, UNSPECIFIED PART OF COLON, UNSPECIFIED TYPE: ICD-10-CM

## 2023-11-03 DIAGNOSIS — Z80.0 FAMILY HISTORY OF COLON CANCER: Primary | ICD-10-CM

## 2023-11-03 LAB
ALBUMIN SERPL BCP-MCNC: 4 G/DL (ref 3.5–5.2)
ALP SERPL-CCNC: 46 U/L (ref 55–135)
ALT SERPL W/O P-5'-P-CCNC: 24 U/L (ref 10–44)
ANION GAP SERPL CALC-SCNC: 13 MMOL/L (ref 8–16)
AST SERPL-CCNC: 20 U/L (ref 10–40)
BASOPHILS # BLD AUTO: 0.08 K/UL (ref 0–0.2)
BASOPHILS NFR BLD: 1.4 % (ref 0–1.9)
BILIRUB SERPL-MCNC: 0.6 MG/DL (ref 0.1–1)
BUN SERPL-MCNC: 25 MG/DL (ref 8–23)
CALCIUM SERPL-MCNC: 9.3 MG/DL (ref 8.7–10.5)
CHLORIDE SERPL-SCNC: 107 MMOL/L (ref 95–110)
CHOLEST SERPL-MCNC: 234 MG/DL (ref 120–199)
CHOLEST/HDLC SERPL: 3.2 {RATIO} (ref 2–5)
CO2 SERPL-SCNC: 24 MMOL/L (ref 23–29)
CREAT SERPL-MCNC: 1 MG/DL (ref 0.5–1.4)
DIFFERENTIAL METHOD: ABNORMAL
EOSINOPHIL # BLD AUTO: 0.1 K/UL (ref 0–0.5)
EOSINOPHIL NFR BLD: 2.1 % (ref 0–8)
ERYTHROCYTE [DISTWIDTH] IN BLOOD BY AUTOMATED COUNT: 12.2 % (ref 11.5–14.5)
EST. GFR  (NO RACE VARIABLE): >60 ML/MIN/1.73 M^2
GLUCOSE SERPL-MCNC: 96 MG/DL (ref 70–110)
HCT VFR BLD AUTO: 50.4 % (ref 40–54)
HDLC SERPL-MCNC: 73 MG/DL (ref 40–75)
HDLC SERPL: 31.2 % (ref 20–50)
HGB BLD-MCNC: 17.1 G/DL (ref 14–18)
IMM GRANULOCYTES # BLD AUTO: 0.03 K/UL (ref 0–0.04)
IMM GRANULOCYTES NFR BLD AUTO: 0.5 % (ref 0–0.5)
LDLC SERPL CALC-MCNC: 150.2 MG/DL (ref 63–159)
LYMPHOCYTES # BLD AUTO: 1.9 K/UL (ref 1–4.8)
LYMPHOCYTES NFR BLD: 33.6 % (ref 18–48)
MCH RBC QN AUTO: 31.6 PG (ref 27–31)
MCHC RBC AUTO-ENTMCNC: 33.9 G/DL (ref 32–36)
MCV RBC AUTO: 93 FL (ref 82–98)
MONOCYTES # BLD AUTO: 0.6 K/UL (ref 0.3–1)
MONOCYTES NFR BLD: 9.9 % (ref 4–15)
NEUTROPHILS # BLD AUTO: 3 K/UL (ref 1.8–7.7)
NEUTROPHILS NFR BLD: 52.5 % (ref 38–73)
NONHDLC SERPL-MCNC: 161 MG/DL
NRBC BLD-RTO: 0 /100 WBC
PLATELET # BLD AUTO: 257 K/UL (ref 150–450)
PMV BLD AUTO: 9.3 FL (ref 9.2–12.9)
POTASSIUM SERPL-SCNC: 4.3 MMOL/L (ref 3.5–5.1)
PROT SERPL-MCNC: 6.8 G/DL (ref 6–8.4)
RBC # BLD AUTO: 5.41 M/UL (ref 4.6–6.2)
SODIUM SERPL-SCNC: 144 MMOL/L (ref 136–145)
TRIGL SERPL-MCNC: 54 MG/DL (ref 30–150)
WBC # BLD AUTO: 5.66 K/UL (ref 3.9–12.7)

## 2023-11-03 PROCEDURE — 1101F PR PT FALLS ASSESS DOC 0-1 FALLS W/OUT INJ PAST YR: ICD-10-PCS | Mod: HCNC,CPTII,S$GLB, | Performed by: INTERNAL MEDICINE

## 2023-11-03 PROCEDURE — 3008F PR BODY MASS INDEX (BMI) DOCUMENTED: ICD-10-PCS | Mod: HCNC,CPTII,S$GLB, | Performed by: INTERNAL MEDICINE

## 2023-11-03 PROCEDURE — 3075F PR MOST RECENT SYSTOLIC BLOOD PRESS GE 130-139MM HG: ICD-10-PCS | Mod: HCNC,CPTII,S$GLB, | Performed by: INTERNAL MEDICINE

## 2023-11-03 PROCEDURE — 1159F MED LIST DOCD IN RCRD: CPT | Mod: HCNC,CPTII,S$GLB, | Performed by: INTERNAL MEDICINE

## 2023-11-03 PROCEDURE — 80061 LIPID PANEL: CPT | Mod: HCNC | Performed by: INTERNAL MEDICINE

## 2023-11-03 PROCEDURE — 99397 PER PM REEVAL EST PAT 65+ YR: CPT | Mod: HCNC,S$GLB,, | Performed by: INTERNAL MEDICINE

## 2023-11-03 PROCEDURE — 1159F PR MEDICATION LIST DOCUMENTED IN MEDICAL RECORD: ICD-10-PCS | Mod: HCNC,CPTII,S$GLB, | Performed by: INTERNAL MEDICINE

## 2023-11-03 PROCEDURE — 3075F SYST BP GE 130 - 139MM HG: CPT | Mod: HCNC,CPTII,S$GLB, | Performed by: INTERNAL MEDICINE

## 2023-11-03 PROCEDURE — 99999 PR PBB SHADOW E&M-EST. PATIENT-LVL IV: CPT | Mod: PBBFAC,HCNC,, | Performed by: INTERNAL MEDICINE

## 2023-11-03 PROCEDURE — 1101F PT FALLS ASSESS-DOCD LE1/YR: CPT | Mod: HCNC,CPTII,S$GLB, | Performed by: INTERNAL MEDICINE

## 2023-11-03 PROCEDURE — 3008F BODY MASS INDEX DOCD: CPT | Mod: HCNC,CPTII,S$GLB, | Performed by: INTERNAL MEDICINE

## 2023-11-03 PROCEDURE — 36415 COLL VENOUS BLD VENIPUNCTURE: CPT | Mod: HCNC | Performed by: INTERNAL MEDICINE

## 2023-11-03 PROCEDURE — 99999 PR PBB SHADOW E&M-EST. PATIENT-LVL IV: ICD-10-PCS | Mod: PBBFAC,HCNC,, | Performed by: INTERNAL MEDICINE

## 2023-11-03 PROCEDURE — 3288F PR FALLS RISK ASSESSMENT DOCUMENTED: ICD-10-PCS | Mod: HCNC,CPTII,S$GLB, | Performed by: INTERNAL MEDICINE

## 2023-11-03 PROCEDURE — 1126F PR PAIN SEVERITY QUANTIFIED, NO PAIN PRESENT: ICD-10-PCS | Mod: HCNC,CPTII,S$GLB, | Performed by: INTERNAL MEDICINE

## 2023-11-03 PROCEDURE — 1126F AMNT PAIN NOTED NONE PRSNT: CPT | Mod: HCNC,CPTII,S$GLB, | Performed by: INTERNAL MEDICINE

## 2023-11-03 PROCEDURE — 99397 PR PREVENTIVE VISIT,EST,65 & OVER: ICD-10-PCS | Mod: HCNC,S$GLB,, | Performed by: INTERNAL MEDICINE

## 2023-11-03 PROCEDURE — 80053 COMPREHEN METABOLIC PANEL: CPT | Mod: HCNC | Performed by: INTERNAL MEDICINE

## 2023-11-03 PROCEDURE — 3078F DIAST BP <80 MM HG: CPT | Mod: HCNC,CPTII,S$GLB, | Performed by: INTERNAL MEDICINE

## 2023-11-03 PROCEDURE — 85025 COMPLETE CBC W/AUTO DIFF WBC: CPT | Mod: HCNC | Performed by: INTERNAL MEDICINE

## 2023-11-03 PROCEDURE — 3078F PR MOST RECENT DIASTOLIC BLOOD PRESSURE < 80 MM HG: ICD-10-PCS | Mod: HCNC,CPTII,S$GLB, | Performed by: INTERNAL MEDICINE

## 2023-11-03 PROCEDURE — 3288F FALL RISK ASSESSMENT DOCD: CPT | Mod: HCNC,CPTII,S$GLB, | Performed by: INTERNAL MEDICINE

## 2023-11-04 ENCOUNTER — TELEPHONE (OUTPATIENT)
Dept: INTERNAL MEDICINE | Facility: CLINIC | Age: 68
End: 2023-11-04
Payer: MEDICARE

## 2023-11-04 DIAGNOSIS — E78.5 HYPERLIPIDEMIA, UNSPECIFIED HYPERLIPIDEMIA TYPE: Primary | ICD-10-CM

## 2023-11-07 RX ORDER — ATORVASTATIN CALCIUM 20 MG/1
20 TABLET, FILM COATED ORAL DAILY
Qty: 90 TABLET | Refills: 1 | Status: SHIPPED | OUTPATIENT
Start: 2023-11-07 | End: 2024-11-06

## 2023-11-07 NOTE — TELEPHONE ENCOUNTER
Yes. Please advise him to start the 20 mg. I would like to recheck his level in 3 months. Lab orders in, please schedule

## 2023-11-07 NOTE — TELEPHONE ENCOUNTER
Spoke with pt, verbalized understanding.  Stated he has atorvastatin 20mg at home but only takes 10mg daily. Can he start taking the 20mg?

## 2023-11-07 NOTE — TELEPHONE ENCOUNTER
Advised pt of new order to take 20mg atorvastatin and recheck levels in 3 months. Pt verbally understanding

## 2023-11-08 VITALS
BODY MASS INDEX: 24.39 KG/M2 | OXYGEN SATURATION: 99 % | WEIGHT: 174.19 LBS | SYSTOLIC BLOOD PRESSURE: 136 MMHG | DIASTOLIC BLOOD PRESSURE: 74 MMHG | HEIGHT: 71 IN | HEART RATE: 62 BPM | TEMPERATURE: 99 F

## 2023-11-08 NOTE — PROGRESS NOTES
Subjective:       Patient ID: Matthew Pisano is a 68 y.o. male.    Chief Complaint: Annual Exam    HPI  He is here for annual exam      Past medical history:  Hyperlipidemia, cervical spinal stenosis, family history of colon cancer, colon adenoma.  He had a colonoscopy April 2018     Medications:  Lipitor 20 mg daily     No known drug allergies      Review of Systems   Constitutional:  Negative for chills, fatigue, fever and unexpected weight change.   Respiratory:  Negative for chest tightness and shortness of breath.    Cardiovascular:  Negative for chest pain and palpitations.   Gastrointestinal:  Negative for abdominal pain and blood in stool.   Neurological:  Negative for dizziness, syncope, numbness and headaches.       Objective:      Physical Exam  HENT:      Right Ear: External ear normal.      Left Ear: External ear normal.      Nose: Nose normal.      Mouth/Throat:      Mouth: Mucous membranes are moist.      Pharynx: Oropharynx is clear.   Eyes:      Pupils: Pupils are equal, round, and reactive to light.   Cardiovascular:      Rate and Rhythm: Normal rate and regular rhythm.      Heart sounds: No murmur heard.  Pulmonary:      Breath sounds: Normal breath sounds.   Abdominal:      General: There is no distension.      Palpations: There is no hepatomegaly.      Tenderness: There is no abdominal tenderness.   Musculoskeletal:      Cervical back: Normal range of motion.   Lymphadenopathy:      Cervical: No cervical adenopathy.      Upper Body:      Right upper body: No axillary adenopathy.      Left upper body: No axillary adenopathy.   Neurological:      Cranial Nerves: No cranial nerve deficit.      Sensory: No sensory deficit.      Motor: Motor function is intact.      Deep Tendon Reflexes: Reflexes are normal and symmetric.         Assessment/Plan       Annual exam.  Check CMP, lipid panel, CBC.  Schedule colonoscopy

## 2023-11-10 ENCOUNTER — CLINICAL SUPPORT (OUTPATIENT)
Dept: ENDOSCOPY | Facility: HOSPITAL | Age: 68
End: 2023-11-10
Attending: INTERNAL MEDICINE
Payer: MEDICARE

## 2023-11-10 ENCOUNTER — TELEPHONE (OUTPATIENT)
Dept: ENDOSCOPY | Facility: HOSPITAL | Age: 68
End: 2023-11-10

## 2023-11-10 DIAGNOSIS — K63.5 POLYP OF COLON, UNSPECIFIED PART OF COLON, UNSPECIFIED TYPE: ICD-10-CM

## 2023-11-10 DIAGNOSIS — Z12.11 COLON CANCER SCREENING: Primary | ICD-10-CM

## 2023-11-10 DIAGNOSIS — Z80.0 FAMILY HISTORY OF COLON CANCER: ICD-10-CM

## 2023-11-10 NOTE — TELEPHONE ENCOUNTER
Spoke to pt to schedule procedure(s) Colonoscopy       Physician to perform procedure(s) Dr. LENIN Penn  Date of Procedure (s) 3/14/24  Arrival Time 6:30 AM  Time of Procedure(s) 7:30 AM   Location of Procedure(s) [facility]  Type of Rx Prep sent to patient: PEG  Instructions provided to patient via Email    Patient was informed on the following information and verbalized understanding. Screening questionnaire reviewed with patient and complete. If procedure requires anesthesia, a responsible adult needs to be present to accompany the patient home, patient cannot drive after receiving anesthesia. Appointment details are tentative, especially check-in time. Patient will receive a prep-op call 4 days prior to confirm check-in time for procedure. If applicable the patient should contact their pharmacy to verify Rx for procedure prep is ready for pick-up. Patient was advised to call the scheduling department at [phone] if pharmacy states no Rx is available. Patient was advised to call the endoscopy scheduling department if any questions or concerns arise.      [facility] Scheduling Department

## 2024-02-08 ENCOUNTER — TELEPHONE (OUTPATIENT)
Dept: INTERNAL MEDICINE | Facility: CLINIC | Age: 69
End: 2024-02-08
Payer: MEDICARE

## 2024-02-08 DIAGNOSIS — M79.673 PAIN OF FOOT, UNSPECIFIED LATERALITY: Primary | ICD-10-CM

## 2024-02-08 NOTE — TELEPHONE ENCOUNTER
----- Message from Raquel Hernandez sent at 2/8/2024  9:34 AM CST -----  Contact: [phone] Patient  Patient would like to get a referral.  Referral to what specialty:  Podiatry  Does the patient want the referral with a specific physician:  No  [facility] the [facility]  Reason (be specific):  Heel Pain  Does the patient already have the specialty clinic appointment scheduled:  No  If yes, what date is the appointment scheduled:     Is the insurance listed in Epic correct? (this is important for a referral):  yes  Advised patient that once provider approves this either a nurse or  will return their call?: yes  Would the patient like a call back, or a response through their [facility] portal?:   Call Back Please. Thank you  Comments:

## 2024-03-05 ENCOUNTER — ANESTHESIA EVENT (OUTPATIENT)
Dept: ENDOSCOPY | Facility: HOSPITAL | Age: 69
End: 2024-03-05
Payer: MEDICARE

## 2024-03-05 NOTE — ANESTHESIA PREPROCEDURE EVALUATION
03/05/2024  Matthew Pisano is a 68 y.o., male.  [facility]  Anesthesia Pre-Operative Evaluation       Patient Name: Matthew Pisano  YOB: 1955  MRN: [id number]  CSN: [id number]      Code Status: No Order   Date of Procedure: 3/14/2024  Anesthesia: Choice Procedure: Procedure(s) (LRB):  COLONOSCOPY (N/A)  Pre-Operative Diagnosis: Family history of colon cancer [Z80.0]  Polyp of colon, unspecified part of colon, unspecified type [K63.5]  Proceduralist: Surgeon(s) and Role:     * Margie Penn MD - Primary Nurse: (Unknown)      SUBJECTIVE:   Matthew Pisano is a 68 y.o. male who  has a past medical history of Hyperlipidemia..     he has a current medication list which includes the following long-term medication(s): atorvastatin and triamcinolone acetonide 0.1%.     ALLERGIES:   Review of patient's allergies indicates:  No Known Allergies  LDA:          Lines/Drains/Airways       None                  Anesthesia Evaluation      Airway   Mallampati: II  Dental      Pulmonary    Cardiovascular     Neuro/Psych      GI/Hepatic/Renal      Endo/Other    Abdominal                     MEDICATIONS:     Antibiotics (From admission, onward)      None          VTE Risk Mitigation (From admission, onward)      None              No current facility-administered medications for this encounter.     Current Outpatient Medications   Medication Sig Dispense Refill    ascorbic acid, vitamin C, (VITAMIN C) 500 MG tablet Take by mouth. 1 Tablet Oral Every day      atorvastatin (LIPITOR) 20 MG tablet Take 1 tablet (20 mg total) by mouth once daily. 90 tablet 1    b complex vitamins tablet 1 Tablet Oral Every day      multivitamin with minerals tablet Take 1 tablet by mouth once daily.      sildenafiL (VIAGRA) 50 MG tablet 1 Tablet Oral Every day prn      tadalafiL (CIALIS) 5 MG tablet Take by mouth. 1  Tablet Oral qd.  Dispense 1 box of Cialis for daily use.      triamcinolone acetonide 0.1% (KENALOG) 0.1 % cream Apply topically 2 (two) times daily. Use to affected areas for up to 2 weeks then take a 1 week break or decrease to 3 times weekly. Do not apply to groin or face 454 g 1          History:   There are no hospital problems to display for this patient.    Surgical History:    has a past surgical history that includes Colonoscopy (N/A, 4/9/2018).   Social History:    has no history on file for sexual activity.  reports that he has never smoked. He has never used smokeless tobacco. He reports current alcohol use. He reports that he does not use drugs.     OBJECTIVE:     Vital Signs (Most Recent):    Vital Signs Range (Last 24H):  BP: ()/()   Arterial Line BP: ()/()        There is no height or weight on file to calculate BMI.   Wt Readings from Last 4 Encounters:   11/03/23 79 kg (174 lb 2.6 oz)   06/07/23 78 kg (172 lb)   12/14/22 81.6 kg (180 lb)   12/13/22 81.9 kg (180 lb 8.9 oz)       Significant Labs:  Lab Results   Component Value Date    WBC 5.66 11/03/2023    HGB 17.1 11/03/2023    HCT 50.4 11/03/2023     11/03/2023     11/03/2023    K 4.3 11/03/2023     11/03/2023    CREATININE 1.0 11/03/2023    BUN 25 (H) 11/03/2023    CO2 24 11/03/2023    GLU 96 11/03/2023    CALCIUM 9.3 11/03/2023    ALKPHOS 46 (L) 11/03/2023    ALT 24 11/03/2023    AST 20 11/03/2023    ALBUMIN 4.0 11/03/2023     No LMP for male patient.  No results found for this or any previous visit (from the past 72 hour(s)).    EKG:   Results for orders placed or performed in visit on 08/12/19   IN OFFICE EKG 12-LEAD (to [place])    Collection Time: 08/12/19  5:57 PM    Narrative    Test Reason : I49.1,    Vent. Rate : 053 BPM     Atrial Rate : 053 BPM     P-R Int : 162 ms          QRS Dur : 080 ms      QT Int : 402 ms       P-R-T Axes : 032 -07 004 degrees     QTc Int : 377 ms    Sinus bradycardia  Otherwise normal ECG  When  compared with ECG of 13-MAR-2012 12:55,  The axis Shifted left    Confirmed by Thierry Cesar MD ([id number]) on 8/17/2019 6:57:24 AM    Referred By: ABILIO MAN           Confirmed By:Thierry Cesar MD       TTE:  Results for orders placed or performed during the hospital encounter of 08/14/19   Transthoracic echo (TTE) 2D with Color Flow   Result Value Ref Range    Ascending aorta 3.31 cm    STJ 2.82 cm    IVRT 0.07 msec    IVS 0.70 (A) 0.6 - 1.1 cm    LA size 3.74 cm    Left Atrium Major Axis 5.04 cm    Left Atrium Minor Axis 5.13 cm    LVIDd 5.23 3.5 - 6.0 cm    LVIDs 3.45 2.1 - 4.0 cm    LVOT diameter 2.13 cm    LVOT peak VTI 23.07 cm    Posterior Wall 0.80 0.6 - 1.1 cm    MV Peak A Sam 0.71 m/s    E wave deceleration time 213.48 msec    MV Peak E Sam 0.83 m/s    PV Peak D Sam 0.55 m/s    PV Peak S Sam 0.63 m/s    RA Major Axis 5.15 cm    RA Width 3.33 cm    RVDD 3.34 cm    Sinus 3.42 cm    TAPSE 2.94 cm    TR Max Sam 1.96 m/s    TDI LATERAL 0.16 m/s    TDI SEPTAL 0.10 m/s    LA WIDTH 3.86 cm    LV Diastolic Volume 131.31 mL    LV Systolic Volume 49.24 mL    RV S' 14.26 cm/s    LVOT peak sam 1.30 m/s    LV LATERAL E/E' RATIO 5.19 m/s    LV SEPTAL E/E' RATIO 8.30 m/s    FS 34 %    LA volume 62.39 cm3    LV mass 135.19 g    Left Ventricle Relative Wall Thickness 0.31 cm    E/A ratio 1.17     Mean e' 0.13 m/s    Pulm vein S/D ratio 1.15     LVOT area 3.6 cm2    LVOT stroke volume 82.16 cm3    E/E' ratio 6.38 m/s    Triscuspid Valve Regurgitation Peak Gradient 15 mmHg    BSA 2.04 m2    LV Systolic Volume Index 24.3 mL/m2    LV Diastolic Volume Index 64.88 mL/m2    LA Volume Index 30.8 mL/m2    LV Mass Index 67 g/m2    Right Atrial Pressure (from IVC) 8 mmHg    TV resting pulmonary artery pressure 23 mmHg    Narrative    · Normal left ventricular systolic function. The estimated ejection   fraction is 60%  · Normal LV diastolic function.  · No wall motion abnormalities.  · Normal right ventricular systolic function.  · The  "estimated PA systolic pressure is 23 mm Hg  · Intermediate central venous pressure (8 mm Hg).        No results found for: "EF"   No results found for this or any previous visit.  TITA:  No results found for this or any previous visit.  Stress Test:  No results found for this or any previous visit.     LHC:  No results found for this or any previous visit.     PFT:  No results found for: "FEV1", "FVC", "LMP3CKX", "TLC", "DLCO"     ASSESSMENT/PLAN:        Pre-op Assessment    I have reviewed the Patient Summary Reports.     I have reviewed the Nursing Notes. I have reviewed the NPO Status.   I have reviewed the Medications.     Review of Systems  Anesthesia Hx:             Denies Family Hx of Anesthesia complications.    Denies Personal Hx of Anesthesia complications.                    Cardiovascular:                hyperlipidemia                                 Physical Exam    Airway:  Mallampati: II         Anesthesia Plan  Type of Anesthesia, risks & benefits discussed:    Anesthesia Type: Gen Natural Airway  Intra-op Monitoring Plan: Standard ASA Monitors  Post Op Pain Control Plan: multimodal analgesia  Induction:  IV  Informed Consent: Informed consent signed with the Patient and all parties understand the risks and agree with anesthesia plan.  All questions answered. Patient consented to blood products? No  ASA Score: 2  Day of Surgery Review of History & Physical: H&P Update referred to the surgeon/provider.    Ready For Surgery From Anesthesia Perspective.     .      "

## 2024-03-07 ENCOUNTER — TELEPHONE (OUTPATIENT)
Dept: ENDOSCOPY | Facility: HOSPITAL | Age: 69
End: 2024-03-07
Payer: MEDICARE

## 2024-03-07 NOTE — TELEPHONE ENCOUNTER
Left voicemail and sent portal message for patient to call Endoscopy Scheduling to review instructions and confirm appointment for Colonoscopy on 3/14/24.

## 2024-03-13 NOTE — H&P
Short Stay Endoscopy History and Physical    PCP - Leticia Andersen MD  Referring Physician - Leticia Andersen MD  [address] [place] [address]    Procedure - Colonoscopy  ASA - per anesthesia  Mallampati - per anesthesia  History of Anesthesia problems - no  Family history Anesthesia problems -  no   Plan of anesthesia - General    HPI  68 y.o. male  Reason for procedure:   Family history of colon cancer [Z80.0]  Polyp of colon, unspecified part of colon, unspecified type [K63.5]     Cscope 2018 one polyp     ROS:  Constitutional: No fevers, chills, No weight loss  CV: No chest pain  Pulm: No cough, No shortness of breath  GI: see HPI    Medical History:  has a past medical history of Hyperlipidemia.    Surgical History:  has a past surgical history that includes Colonoscopy (N/A, 4/9/2018).    Family History: family history includes Bladder Cancer in his father; Pancreatic cancer in his brother..    Social History:  reports that he has never smoked. He has never used smokeless tobacco. He reports current alcohol use. He reports that he does not use drugs.    Review of patient's allergies indicates:  No Known Allergies    Medications:   Medications Prior to Admission   Medication Sig Dispense Refill Last Dose    ascorbic acid, vitamin C, (VITAMIN C) 500 MG tablet Take by mouth. 1 Tablet Oral Every day   3/13/2024    atorvastatin (LIPITOR) 20 MG tablet Take 1 tablet (20 mg total) by mouth once daily. 90 tablet 1 3/13/2024    b complex vitamins tablet 1 Tablet Oral Every day   3/13/2024    multivitamin with minerals tablet Take 1 tablet by mouth once daily.   3/13/2024    triamcinolone acetonide 0.1% (KENALOG) 0.1 % cream Apply topically 2 (two) times daily. Use to affected areas for up to 2 weeks then take a 1 week break or decrease to 3 times weekly. Do not apply to groin or face 454 g 1 Past Month    sildenafiL (VIAGRA) 50 MG tablet 1 Tablet Oral Every day prn   More than a month    tadalafiL  (CIALIS) 5 MG tablet Take by mouth. 1 Tablet Oral qd.  Dispense 1 box of Cialis for daily use.   More than a month       Physical Exam:    Vital Signs:   Vitals:    03/14/24 0653   BP: (!) 159/71   Pulse: 79   Resp: 16       General Appearance: Well appearing in no acute distress  Abdomen: Soft, non tender, non distended with normal bowel sounds, no masses    Labs:  Lab Results   Component Value Date    WBC 5.66 11/03/2023    HGB 17.1 11/03/2023    HCT 50.4 11/03/2023     11/03/2023    CHOL 234 (H) 11/03/2023    TRIG 54 11/03/2023    HDL 73 11/03/2023    ALT 24 11/03/2023    AST 20 11/03/2023     11/03/2023    K 4.3 11/03/2023     11/03/2023    CREATININE 1.0 11/03/2023    BUN 25 (H) 11/03/2023    CO2 24 11/03/2023    TSH 1.287 08/12/2019    PSA 2.3 12/14/2022       I have explained the risks and benefits of this endoscopic procedure to the patient including but not limited to bleeding, inflammation, infection, perforation, and death.    Assessment/Plan:     CRC Surveillance     - Proceed with colonoscopy     Margie Penn MD  Gastroenterology   [facility]

## 2024-03-14 ENCOUNTER — HOSPITAL ENCOUNTER (OUTPATIENT)
Facility: HOSPITAL | Age: 69
Discharge: HOME | End: 2024-03-14
Attending: STUDENT IN AN ORGANIZED HEALTH CARE EDUCATION/TRAINING PROGRAM | Admitting: STUDENT IN AN ORGANIZED HEALTH CARE EDUCATION/TRAINING PROGRAM
Payer: MEDICARE

## 2024-03-14 ENCOUNTER — ANESTHESIA (OUTPATIENT)
Dept: ENDOSCOPY | Facility: HOSPITAL | Age: 69
End: 2024-03-14
Payer: MEDICARE

## 2024-03-14 VITALS
HEART RATE: 62 BPM | HEIGHT: 70 IN | OXYGEN SATURATION: 98 % | BODY MASS INDEX: 25.05 KG/M2 | TEMPERATURE: 98 F | RESPIRATION RATE: 20 BRPM | WEIGHT: 175 LBS | SYSTOLIC BLOOD PRESSURE: 119 MMHG | DIASTOLIC BLOOD PRESSURE: 77 MMHG

## 2024-03-14 DIAGNOSIS — Z12.11 COLON CANCER SCREENING: ICD-10-CM

## 2024-03-14 DIAGNOSIS — Z12.11 SCREEN FOR COLON CANCER: Primary | ICD-10-CM

## 2024-03-14 PROCEDURE — 25000003 PHARM REV CODE 250: Performed by: NURSE ANESTHETIST, CERTIFIED REGISTERED

## 2024-03-14 PROCEDURE — D9220A PRA ANESTHESIA: Mod: PT,,, | Performed by: NURSE ANESTHETIST, CERTIFIED REGISTERED

## 2024-03-14 PROCEDURE — 37000009 HC ANESTHESIA EA ADD 15 MINS: Performed by: STUDENT IN AN ORGANIZED HEALTH CARE EDUCATION/TRAINING PROGRAM

## 2024-03-14 PROCEDURE — 88305 TISSUE EXAM BY PATHOLOGIST: CPT | Mod: 26,HCNC,, | Performed by: PATHOLOGY

## 2024-03-14 PROCEDURE — 45385 COLONOSCOPY W/LESION REMOVAL: CPT | Mod: PT,HCNC,, | Performed by: STUDENT IN AN ORGANIZED HEALTH CARE EDUCATION/TRAINING PROGRAM

## 2024-03-14 PROCEDURE — 27201089 HC SNARE, DISP (ANY): Performed by: STUDENT IN AN ORGANIZED HEALTH CARE EDUCATION/TRAINING PROGRAM

## 2024-03-14 PROCEDURE — 45385 COLONOSCOPY W/LESION REMOVAL: CPT | Mod: PT | Performed by: STUDENT IN AN ORGANIZED HEALTH CARE EDUCATION/TRAINING PROGRAM

## 2024-03-14 PROCEDURE — 37000008 HC ANESTHESIA 1ST 15 MINUTES: Performed by: STUDENT IN AN ORGANIZED HEALTH CARE EDUCATION/TRAINING PROGRAM

## 2024-03-14 PROCEDURE — 99900035 HC TECH TIME PER 15 MIN (STAT)

## 2024-03-14 PROCEDURE — 88305 TISSUE EXAM BY PATHOLOGIST: CPT | Performed by: PATHOLOGY

## 2024-03-14 PROCEDURE — 94761 N-INVAS EAR/PLS OXIMETRY MLT: CPT

## 2024-03-14 RX ORDER — LIDOCAINE HYDROCHLORIDE 20 MG/ML
INJECTION INTRAVENOUS
Status: DISCONTINUED | OUTPATIENT
Start: 2024-03-14 | End: 2024-03-14

## 2024-03-14 RX ORDER — PROPOFOL 10 MG/ML
VIAL (ML) INTRAVENOUS CONTINUOUS PRN
Status: DISCONTINUED | OUTPATIENT
Start: 2024-03-14 | End: 2024-03-14

## 2024-03-14 RX ORDER — SODIUM CHLORIDE 9 MG/ML
INJECTION, SOLUTION INTRAVENOUS CONTINUOUS
Status: DISCONTINUED | OUTPATIENT
Start: 2024-03-14 | End: 2024-03-14 | Stop reason: HOSPADM

## 2024-03-14 RX ADMIN — Medication 150 MCG/KG/MIN: at 07:03

## 2024-03-14 RX ADMIN — SODIUM CHLORIDE: 0.9 INJECTION, SOLUTION INTRAVENOUS at 07:03

## 2024-03-14 RX ADMIN — Medication 80 MG: at 07:03

## 2024-03-14 RX ADMIN — LIDOCAINE HYDROCHLORIDE 20 MG: 20 INJECTION INTRAVENOUS at 07:03

## 2024-03-14 NOTE — PLAN OF CARE
Pt given discharge and instructions at the bedside. All questions answered at the bedside.  Pt verbalizes understanding. Pt VSS. Pt IV access removed; bleeding controlled. Pt dressed per self. Pt wheelchair transport provided. Will continue to monitor. ENDO report left at bedside per Dr. Penn.

## 2024-03-14 NOTE — PROVATION PATIENT INSTRUCTIONS
Discharge Summary/Instructions after an Endoscopic Procedure  Patient Name: Matthew Pisano  Patient MRN: [id number]  Patient YOB: 1955 Thursday, March 14, 2024  Margie Penn MD  Dear patient,  As a result of recent federal legislation (The Federal Cures Act), you may   receive lab or pathology results from your procedure in your [facility]   account before your physician is able to contact you. Your physician or   their representative will relay the results to you with their   recommendations at their soonest availability.  Thank you,  RESTRICTIONS:  During your procedure today, you received medications for sedation.  These   medications may affect your judgment, balance and coordination.  Therefore,   for 24 hours, you have the following restrictions:   - DO NOT drive a car, operate machinery, make legal/financial decisions,   sign important papers or drink alcohol.    ACTIVITY:  Today: no heavy lifting, straining or running due to procedural   sedation/anesthesia.  The following day: return to full activity including work.  DIET:  Eat and drink normally unless instructed otherwise.     TREATMENT FOR COMMON SIDE EFFECTS:  - Mild abdominal pain, nausea, belching, bloating or excessive gas:  rest,   eat lightly and use a heating pad.  - Sore Throat: treat with throat lozenges and/or gargle with warm salt   water.  - Because air was used during the procedure, expelling large amounts of air   from your rectum or belching is normal.  - If a bowel prep was taken, you may not have a bowel movement for 1-3 days.    This is normal.  SYMPTOMS TO WATCH FOR AND REPORT TO YOUR PHYSICIAN:  1. Abdominal pain or bloating, other than gas cramps.  2. Chest pain.  3. Back pain.  4. Signs of infection such as: chills or fever occurring within 24 hours   after the procedure.  5. Rectal bleeding, which would show as bright red, maroon, or black stools.   (A tablespoon of blood from the rectum is not serious, especially if    hemorrhoids are present.)  6. Vomiting.  7. Weakness or dizziness.  GO DIRECTLY TO THE NEAREST EMERGENCY ROOM IF YOU HAVE ANY OF THE FOLLOWING:      Difficulty breathing              Chills and/or fever over 101 F   Persistent vomiting and/or vomiting blood   Severe abdominal pain   Severe chest pain   Black, tarry stools   Bleeding- more than one tablespoon   Any other symptom or condition that you feel may need urgent attention  Your doctor recommends these additional instructions:  If any biopsies were taken, your doctors clinic will contact you in 1 to 2   weeks with any results.  - Discharge patient to home (ambulatory).   - Resume regular diet.   - Continue present medications.   - Await pathology results.   - Repeat colonoscopy in 3 years for surveillance with extended bowel prep.  For questions, problems or results please call your physician - Margie Penn MD at Work:  [phone].  [facility], EMERGENCY ROOM PHONE NUMBER: [phone]  IF A COMPLICATION OR EMERGENCY SITUATION ARISES AND YOU ARE UNABLE TO REACH   YOUR PHYSICIAN - GO DIRECTLY TO THE EMERGENCY ROOM.  Margie Penn MD  3/14/2024 7:51:05 AM  This report has been verified and signed electronically.  Dear patient,  As a result of recent federal legislation (The Federal Cures Act), you may   receive lab or pathology results from your procedure in your [facility]   account before your physician is able to contact you. Your physician or   their representative will relay the results to you with their   recommendations at their soonest availability.  Thank you,  PROVATION

## 2024-03-14 NOTE — PLAN OF CARE
Patient AAOx3. Consents need to be signed, H&P needed, girlfriend at bedside, all concerns addressed. Patient has no complaints at this time.

## 2024-03-14 NOTE — TRANSFER OF CARE
"Anesthesia Transfer of Care Note    Patient: Matthew Pisano    Procedure(s) Performed: Procedure(s) (LRB):  COLONOSCOPY (N/A)    Patient location: PACU    Anesthesia Type: general    Transport from OR: Transported from OR on room air with adequate spontaneous ventilation    Post pain: adequate analgesia    Post assessment: no apparent anesthetic complications    Post vital signs: stable    Level of consciousness: sedated    Nausea/Vomiting: no nausea/vomiting    Complications: none    Transfer of care protocol was followed      Last vitals: Visit Vitals  BP (!) 159/71 (BP Location: Left arm, Patient Position: Lying)   Pulse 79   Resp 16   Ht 5' 10" (1.778 m)   Wt 79.4 kg (175 lb)   SpO2 97%   BMI 25.11 kg/m²     "

## 2024-03-14 NOTE — ANESTHESIA POSTPROCEDURE EVALUATION
Anesthesia Post Evaluation    Patient: Matthew Pisano    Procedure(s) Performed: Procedure(s) (LRB):  COLONOSCOPY (N/A)    Final Anesthesia Type: general      Patient location during evaluation: PACU  Patient participation: Yes- Able to Participate  Level of consciousness: awake and alert  Post-procedure vital signs: reviewed and stable  Pain management: adequate  Airway patency: patent    PONV status at discharge: No PONV  Anesthetic complications: no      Cardiovascular status: stable  Respiratory status: spontaneous ventilation  Hydration status: euvolemic  Follow-up not needed.              Vitals Value Taken Time   /77 03/14/24 0812   Temp 36.6 °C (97.8 °F) 03/14/24 0751   Pulse 62 03/14/24 0812   Resp 18 03/14/24 0812   SpO2 98 % 03/14/24 0812         Event Time   Out of Recovery 08:06:00         Pain/John Score: John Score: 10 (3/14/2024  8:03 AM)

## 2024-03-18 LAB
FINAL PATHOLOGIC DIAGNOSIS: NORMAL
GROSS: NORMAL
Lab: NORMAL

## 2024-04-17 ENCOUNTER — TELEPHONE (OUTPATIENT)
Dept: INTERNAL MEDICINE | Facility: CLINIC | Age: 69
End: 2024-04-17
Payer: MEDICARE

## 2024-04-17 NOTE — TELEPHONE ENCOUNTER
He's been seeing a dermatologist for right sided facial and neck numbness. He was given a cream that seemed to help his symptoms of urticaria and tingling. Since this has been going on x 6 months, they are now believing that this could be a problem in the neck and some sort of nerve issue. No prior history of neck injury. Denies pain going down arm or tingling of the fingers.

## 2024-04-17 NOTE — TELEPHONE ENCOUNTER
Needs an appt, but I do not have anything available this week. Does Chelsie have anything available?

## 2024-04-17 NOTE — TELEPHONE ENCOUNTER
----- Message from Alyce Lakhani sent at 4/17/2024 12:04 PM CDT -----  Contact: Pt  [phone]  Would like to receive medical advice.  Would they like a call back or a response via [facility]:  Call Back  Additional information:      Pt is calling to get an order for an MRI put in because it was recommended by his dermatologist, who is not part of [facility].

## 2024-04-18 ENCOUNTER — OFFICE VISIT (OUTPATIENT)
Dept: INTERNAL MEDICINE | Facility: CLINIC | Age: 69
End: 2024-04-18
Payer: MEDICARE

## 2024-04-18 ENCOUNTER — LAB VISIT (OUTPATIENT)
Dept: LAB | Facility: HOSPITAL | Age: 69
End: 2024-04-18
Payer: MEDICARE

## 2024-04-18 VITALS
WEIGHT: 179.44 LBS | HEIGHT: 70 IN | OXYGEN SATURATION: 98 % | HEART RATE: 70 BPM | SYSTOLIC BLOOD PRESSURE: 138 MMHG | DIASTOLIC BLOOD PRESSURE: 82 MMHG | BODY MASS INDEX: 25.69 KG/M2

## 2024-04-18 DIAGNOSIS — M54.2 CERVICALGIA: ICD-10-CM

## 2024-04-18 DIAGNOSIS — R20.2 FACIAL PARESTHESIA: ICD-10-CM

## 2024-04-18 DIAGNOSIS — R20.2 FACIAL PARESTHESIA: Primary | ICD-10-CM

## 2024-04-18 LAB
ALBUMIN SERPL BCP-MCNC: 3.9 G/DL (ref 3.5–5.2)
ALP SERPL-CCNC: 43 U/L (ref 55–135)
ALT SERPL W/O P-5'-P-CCNC: 23 U/L (ref 10–44)
ANION GAP SERPL CALC-SCNC: 7 MMOL/L (ref 8–16)
AST SERPL-CCNC: 28 U/L (ref 10–40)
BASOPHILS # BLD AUTO: 0.07 K/UL (ref 0–0.2)
BASOPHILS NFR BLD: 1.1 % (ref 0–1.9)
BILIRUB SERPL-MCNC: 0.7 MG/DL (ref 0.1–1)
BUN SERPL-MCNC: 18 MG/DL (ref 8–23)
CALCIUM SERPL-MCNC: 9 MG/DL (ref 8.7–10.5)
CHLORIDE SERPL-SCNC: 106 MMOL/L (ref 95–110)
CO2 SERPL-SCNC: 27 MMOL/L (ref 23–29)
CREAT SERPL-MCNC: 1 MG/DL (ref 0.5–1.4)
CRP SERPL-MCNC: 0.6 MG/L (ref 0–8.2)
DIFFERENTIAL METHOD BLD: ABNORMAL
EOSINOPHIL # BLD AUTO: 0.1 K/UL (ref 0–0.5)
EOSINOPHIL NFR BLD: 1.2 % (ref 0–8)
ERYTHROCYTE [DISTWIDTH] IN BLOOD BY AUTOMATED COUNT: 12.9 % (ref 11.5–14.5)
ERYTHROCYTE [SEDIMENTATION RATE] IN BLOOD BY PHOTOMETRIC METHOD: <2 MM/HR (ref 0–23)
EST. GFR  (NO RACE VARIABLE): >60 ML/MIN/1.73 M^2
GLUCOSE SERPL-MCNC: 79 MG/DL (ref 70–110)
HCT VFR BLD AUTO: 46.8 % (ref 40–54)
HGB BLD-MCNC: 15.5 G/DL (ref 14–18)
IMM GRANULOCYTES # BLD AUTO: 0.02 K/UL (ref 0–0.04)
IMM GRANULOCYTES NFR BLD AUTO: 0.3 % (ref 0–0.5)
LYMPHOCYTES # BLD AUTO: 1.8 K/UL (ref 1–4.8)
LYMPHOCYTES NFR BLD: 27.6 % (ref 18–48)
MCH RBC QN AUTO: 31.1 PG (ref 27–31)
MCHC RBC AUTO-ENTMCNC: 33.1 G/DL (ref 32–36)
MCV RBC AUTO: 94 FL (ref 82–98)
MONOCYTES # BLD AUTO: 0.6 K/UL (ref 0.3–1)
MONOCYTES NFR BLD: 8.6 % (ref 4–15)
NEUTROPHILS # BLD AUTO: 4 K/UL (ref 1.8–7.7)
NEUTROPHILS NFR BLD: 61.2 % (ref 38–73)
NRBC BLD-RTO: 0 /100 WBC
PLATELET # BLD AUTO: 240 K/UL (ref 150–450)
PMV BLD AUTO: 9.9 FL (ref 9.2–12.9)
POTASSIUM SERPL-SCNC: 4.4 MMOL/L (ref 3.5–5.1)
PROT SERPL-MCNC: 6.5 G/DL (ref 6–8.4)
RBC # BLD AUTO: 4.98 M/UL (ref 4.6–6.2)
SODIUM SERPL-SCNC: 140 MMOL/L (ref 136–145)
VIT B12 SERPL-MCNC: 721 PG/ML (ref 210–950)
WBC # BLD AUTO: 6.49 K/UL (ref 3.9–12.7)

## 2024-04-18 PROCEDURE — 3288F FALL RISK ASSESSMENT DOCD: CPT | Mod: HCNC,CPTII,S$GLB, | Performed by: PHYSICIAN ASSISTANT

## 2024-04-18 PROCEDURE — 3075F SYST BP GE 130 - 139MM HG: CPT | Mod: HCNC,CPTII,S$GLB, | Performed by: PHYSICIAN ASSISTANT

## 2024-04-18 PROCEDURE — 80053 COMPREHEN METABOLIC PANEL: CPT | Mod: HCNC | Performed by: PHYSICIAN ASSISTANT

## 2024-04-18 PROCEDURE — 99214 OFFICE O/P EST MOD 30 MIN: CPT | Mod: HCNC,S$GLB,, | Performed by: PHYSICIAN ASSISTANT

## 2024-04-18 PROCEDURE — 85025 COMPLETE CBC W/AUTO DIFF WBC: CPT | Mod: HCNC | Performed by: PHYSICIAN ASSISTANT

## 2024-04-18 PROCEDURE — 85652 RBC SED RATE AUTOMATED: CPT | Mod: HCNC | Performed by: PHYSICIAN ASSISTANT

## 2024-04-18 PROCEDURE — 3008F BODY MASS INDEX DOCD: CPT | Mod: HCNC,CPTII,S$GLB, | Performed by: PHYSICIAN ASSISTANT

## 2024-04-18 PROCEDURE — 99999 PR PBB SHADOW E&M-EST. PATIENT-LVL IV: CPT | Mod: PBBFAC,HCNC,, | Performed by: PHYSICIAN ASSISTANT

## 2024-04-18 PROCEDURE — 82607 VITAMIN B-12: CPT | Mod: HCNC | Performed by: PHYSICIAN ASSISTANT

## 2024-04-18 PROCEDURE — 86140 C-REACTIVE PROTEIN: CPT | Mod: HCNC | Performed by: PHYSICIAN ASSISTANT

## 2024-04-18 PROCEDURE — 1126F AMNT PAIN NOTED NONE PRSNT: CPT | Mod: HCNC,CPTII,S$GLB, | Performed by: PHYSICIAN ASSISTANT

## 2024-04-18 PROCEDURE — 36415 COLL VENOUS BLD VENIPUNCTURE: CPT | Mod: HCNC | Performed by: PHYSICIAN ASSISTANT

## 2024-04-18 PROCEDURE — 3079F DIAST BP 80-89 MM HG: CPT | Mod: HCNC,CPTII,S$GLB, | Performed by: PHYSICIAN ASSISTANT

## 2024-04-18 PROCEDURE — 1101F PT FALLS ASSESS-DOCD LE1/YR: CPT | Mod: HCNC,CPTII,S$GLB, | Performed by: PHYSICIAN ASSISTANT

## 2024-04-18 PROCEDURE — 1159F MED LIST DOCD IN RCRD: CPT | Mod: HCNC,CPTII,S$GLB, | Performed by: PHYSICIAN ASSISTANT

## 2024-04-18 NOTE — PROGRESS NOTES
INTERNAL MEDICINE URGENT VISIT NOTE    CHIEF COMPLAINT     Chief Complaint   Patient presents with    facial itching       HPI     Matthew Pisano is a 68 y.o. male who presents for an urgent visit today.    PCP is Leticia Andersen MD, patient is new to me.     Patient presents with complaints of slight itching to the right side of his face -subacute symptoms have been going on for months. He has been seeing dermatology for these symptoms for the past 6 months, he reports only temporary relief with PRN topical steroids. He reports that itching is worse at night and also felt on his right shoulder and arm. Dermatologist recommends MRI for eavl of possible nerve issue? Pt reports labrum tear on right shoulder but denies any other previous injury or pathology. He denies neck pain, numbness, tingling, weakness in the right arm. He denies facail rash or skin lesions. He does report that he had suspected shingles on the right upper back a few years ago but reports there was no associated rash - no nerve pain and burning at that time.     Past Medical History:  Past Medical History:   Diagnosis Date    Hyperlipidemia        Home Medications:  Prior to Admission medications    Medication Sig Start Date End Date Taking? Authorizing Provider   ascorbic acid, vitamin C, (VITAMIN C) 500 MG tablet Take by mouth. 1 Tablet Oral Every day    Provider, Historical   atorvastatin (LIPITOR) 20 MG tablet Take 1 tablet (20 mg total) by mouth once daily. 11/7/23 11/6/24  Leticia Andersen MD   b complex vitamins tablet 1 Tablet Oral Every day    Provider, Historical   multivitamin with minerals tablet Take 1 tablet by mouth once daily.    Provider, Historical   sildenafiL (VIAGRA) 50 MG tablet 1 Tablet Oral Every day prn    Provider, Historical   tadalafiL (CIALIS) 5 MG tablet Take by mouth. 1 Tablet Oral qd.  Dispense 1 box of Cialis for daily use.    Provider, Historical   triamcinolone acetonide 0.1% (KENALOG) 0.1 % cream Apply  "topically 2 (two) times daily. Use to affected areas for up to 2 weeks then take a 1 week break or decrease to 3 times weekly. Do not apply to groin or face 9/8/23   Kuldip Winslow MD       Review of Systems:  Review of Systems   Constitutional:  Negative for chills and fever.   HENT:  Negative for sore throat and trouble swallowing.    Eyes:  Negative for visual disturbance.   Respiratory:  Negative for cough and shortness of breath.    Cardiovascular:  Negative for chest pain.   Gastrointestinal:  Negative for abdominal pain, constipation, diarrhea, nausea and vomiting.   Genitourinary:  Negative for dysuria and flank pain.   Musculoskeletal:  Negative for back pain, neck pain and neck stiffness.   Skin:  Negative for rash.        Itching    Neurological:  Negative for dizziness, syncope, weakness and headaches.   Psychiatric/Behavioral:  Negative for confusion.        Health Maintainence:   Immunizations:  Health Maintenance         Date Due Completion Date    Hepatitis C Screening Never done ---    Hemoglobin A1c (Diabetic Prevention Screening) Never done ---    Shingles Vaccine (1 of 2) Never done ---    RSV Vaccine (Age 60+ and Pregnant patients) (1 - 1-dose 60+ series) Never done ---    TETANUS VACCINE 04/06/2020 4/6/2010    Pneumococcal Vaccines (Age 65+) (1 of 1 - PCV) Never done ---    Influenza Vaccine (1) Never done ---    COVID-19 Vaccine (1 - 2023-24 season) Never done ---    Colorectal Cancer Screening 03/14/2027 3/14/2024    Lipid Panel 11/03/2028 11/3/2023             PHYSICAL EXAM     /82 (BP Location: Left arm, Patient Position: Sitting, BP Method: Medium (Manual))   Pulse 70   Ht 5' 10" (1.778 m)   Wt 81.4 kg (179 lb 7.3 oz)   SpO2 98%   BMI 25.75 kg/m²     Physical Exam  Vitals and nursing note reviewed.   Constitutional:       Appearance: Normal appearance.      Comments: Healthy appearing male in NAD or apparent pain. He makes good eye contact, speaks in clear full sentences and " "ambulates with ease.        HENT:      Head: Normocephalic and atraumatic.      Nose: Nose normal.      Mouth/Throat:      Pharynx: Oropharynx is clear.   Eyes:      Conjunctiva/sclera: Conjunctivae normal.   Cardiovascular:      Rate and Rhythm: Normal rate and regular rhythm.      Pulses: Normal pulses.   Pulmonary:      Effort: No respiratory distress.   Abdominal:      Tenderness: There is no abdominal tenderness.   Musculoskeletal:         General: Normal range of motion.      Cervical back: No rigidity.   Skin:     General: Skin is warm and dry.      Capillary Refill: Capillary refill takes less than 2 seconds.      Findings: No rash.      Comments: No skin lesions or abn identified.    Neurological:      General: No focal deficit present.      Mental Status: He is alert.      Gait: Gait normal.      Comments: Normal neuro exam    Psychiatric:         Mood and Affect: Mood normal.         LABS     No results found for: "LABA1C", "HGBA1C"  CMP  Sodium   Date Value Ref Range Status   11/03/2023 144 136 - 145 mmol/L Final     Potassium   Date Value Ref Range Status   11/03/2023 4.3 3.5 - 5.1 mmol/L Final     Chloride   Date Value Ref Range Status   11/03/2023 107 95 - 110 mmol/L Final     CO2   Date Value Ref Range Status   11/03/2023 24 23 - 29 mmol/L Final     Glucose   Date Value Ref Range Status   11/03/2023 96 70 - 110 mg/dL Final     BUN   Date Value Ref Range Status   11/03/2023 25 (H) 8 - 23 mg/dL Final     Creatinine   Date Value Ref Range Status   11/03/2023 1.0 0.5 - 1.4 mg/dL Final     Calcium   Date Value Ref Range Status   11/03/2023 9.3 8.7 - 10.5 mg/dL Final     Total Protein   Date Value Ref Range Status   11/03/2023 6.8 6.0 - 8.4 g/dL Final     Albumin   Date Value Ref Range Status   11/03/2023 4.0 3.5 - 5.2 g/dL Final     Total Bilirubin   Date Value Ref Range Status   11/03/2023 0.6 0.1 - 1.0 mg/dL Final     Comment:     For infants and newborns, interpretation of results should be based  on " gestational age, weight and in agreement with clinical  observations.    Premature Infant recommended reference ranges:  Up to 24 hours.............<8.0 mg/dL  Up to 48 hours............<12.0 mg/dL  3-5 days..................<15.0 mg/dL  6-29 days.................<15.0 mg/dL       Alkaline Phosphatase   Date Value Ref Range Status   11/03/2023 46 (L) 55 - 135 U/L Final     AST   Date Value Ref Range Status   11/03/2023 20 10 - 40 U/L Final     ALT   Date Value Ref Range Status   11/03/2023 24 10 - 44 U/L Final     Anion Gap   Date Value Ref Range Status   11/03/2023 13 8 - 16 mmol/L Final     eGFR if    Date Value Ref Range Status   01/26/2021 >60.0 >60 mL/min/1.73 m^2 Final     eGFR if non    Date Value Ref Range Status   01/26/2021 >60.0 >60 mL/min/1.73 m^2 Final     Comment:     Calculation used to obtain the estimated glomerular filtration  rate (eGFR) is the CKD-EPI equation.        Lab Results   Component Value Date    WBC 5.66 11/03/2023    HGB 17.1 11/03/2023    HCT 50.4 11/03/2023    MCV 93 11/03/2023     11/03/2023     Lab Results   Component Value Date    CHOL 234 (H) 11/03/2023    CHOL 223 (H) 12/14/2022    CHOL 210 (H) 01/26/2021     Lab Results   Component Value Date    HDL 73 11/03/2023    HDL 60 12/14/2022    HDL 38 (L) 01/26/2021     Lab Results   Component Value Date    LDLCALC 150.2 11/03/2023    LDLCALC 152.6 12/14/2022    LDLCALC 155.2 01/26/2021     Lab Results   Component Value Date    TRIG 54 11/03/2023    TRIG 52 12/14/2022    TRIG 84 01/26/2021     Lab Results   Component Value Date    CHOLHDL 31.2 11/03/2023    CHOLHDL 26.9 12/14/2022    CHOLHDL 18.1 (L) 01/26/2021     Lab Results   Component Value Date    TSH 1.287 08/12/2019       ASSESSMENT/PLAN     Matthew Pisano is a 68 y.o. male     Matthew was seen today for itching - paresthesias to the right side of face - will check labs for signs of inflammation or vitamin deficiency. Will review cervical  MRI given upper arm symptoms. Will plan to compare to previous cervical MRI from years ago. Orho referral placed for right shoulder eval. He is aware of ED Prompts.     Diagnoses and all orders for this visit:    Facial paresthesia  -     CBC Auto Differential; Future  -     COMPREHENSIVE METABOLIC PANEL; Future  -     Sedimentation rate; Future  -     C-REACTIVE PROTEIN; Future  -     VITAMIN B12; Future  -     Ambulatory referral/consult to Orthopedics; Future    Cervicalgia  -     CBC Auto Differential; Future  -     COMPREHENSIVE METABOLIC PANEL; Future  -     Sedimentation rate; Future  -     C-REACTIVE PROTEIN; Future  -     VITAMIN B12; Future  -     MRI Cervical Spine Without Contrast; Future  -     Ambulatory referral/consult to Orthopedics; Future     Patient was counseled on when and how to seek emergent care.       Chelsie Frar PA-C   Department of Internal Medicine - [facility]   1:23 PM

## 2024-05-06 ENCOUNTER — TELEPHONE (OUTPATIENT)
Dept: VASCULAR SURGERY | Facility: CLINIC | Age: 69
End: 2024-05-06
Payer: MEDICARE

## 2024-05-06 NOTE — TELEPHONE ENCOUNTER
Contacted pt in response to message. Notified pt that Dr. Correia does not see patients for this problem. Discussed need for referral to vascular surgery for this problem, locations where patient can be seen, and required imaging. Pt verbalized understanding and states he will obtain referral from PCP.----- Message from Leticia Lane sent at 5/6/2024  1:15 PM CDT -----  Type:  Sooner Apoointment Request    Caller is requesting a sooner appointment.  Caller declined first available appointment listed below.  Caller will not accept being placed on the waitlist and is requesting a message be sent to doctor.  Name of Caller: Pt  When is the first available appointment?  Symptoms: large veins (left leg)  Would the patient rather a call back or a response via [facility]? Call  Best Call Back Number: [phone]  Additional Information:  Pt was referred by PCP provider, Dr. Castro.

## 2024-05-08 ENCOUNTER — TELEPHONE (OUTPATIENT)
Dept: INTERNAL MEDICINE | Facility: CLINIC | Age: 69
End: 2024-05-08
Payer: MEDICARE

## 2024-05-08 DIAGNOSIS — I83.90 VARICOSE VEINS OF LOWER EXTREMITY, UNSPECIFIED LATERALITY, UNSPECIFIED WHETHER COMPLICATED: Primary | ICD-10-CM

## 2024-05-08 NOTE — TELEPHONE ENCOUNTER
----- Message from Teresita Rodriguez sent at 5/8/2024  2:00 PM CDT -----  Contact: Self/ [phone]  Type: Orders Request    What orders/ testing are being requested?MRI of the Cervical     Would you prefer a response via [employer]?No, would like a return call     Comments: pt said that he was seen by a female doctor that took Dr Andersen place but does not remember the provider;s name . Please advise

## 2024-05-08 NOTE — TELEPHONE ENCOUNTER
----- Message from Teresita Rodriguez sent at 5/8/2024  2:05 PM CDT -----  Contact: Self/ [phone]  Patient would like to get a referral.  Referral to what specialty:  Vascular   Does the patient want the referral with a specific physician:  No   [facility] the [facility]   Reason (be specific):  bulging painful broken blood vessels in left leg   Does the patient already have the specialty clinic appointment scheduled:  No   If yes, what date is the appointment scheduled:     Is the insurance listed in Epic correct? (this is important for a referral):  yes   Advised patient that once provider approves this either a nurse or  will return their call?: yes   Would the patient like a call back, or a response through their [facility] portal?:   call back   Comments:

## 2024-05-09 ENCOUNTER — TELEPHONE (OUTPATIENT)
Dept: VASCULAR SURGERY | Facility: CLINIC | Age: 69
End: 2024-05-09
Payer: MEDICARE

## 2024-05-09 DIAGNOSIS — M79.89 PAIN AND SWELLING OF LOWER EXTREMITY: Primary | ICD-10-CM

## 2024-05-09 DIAGNOSIS — M79.606 PAIN AND SWELLING OF LOWER EXTREMITY: Primary | ICD-10-CM

## 2024-05-09 DIAGNOSIS — I80.03 PHLEBITIS AND THROMBOPHLEBITIS OF SUPERFICIAL VESSELS OF LOWER EXTREMITIES, BILATERAL: ICD-10-CM

## 2024-05-09 NOTE — TELEPHONE ENCOUNTER
Spoke w/pt, VI study & NP appts scheduled. 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 Informed that can likely be arranged, depending on Dr. Kumar's schedule. Pt verb understanding.         ----- Message from Denise Faye MA sent at 5/9/2024  9:42 AM CDT -----  Regarding: FW: Consult to Vascular Surgery    ----- Message -----  From: Leida Jasso  Sent: 5/9/2024   9:08 AM CDT  To: [facility] Vascular Surgery Clinical Support  Subject: Consult to Vascular Surgery                      Good morning, Dr. Leticia Andersen placed a referral for patient to Consult to Vascular Surgery. Please contact patient and assist with scheduling, thanks.      Varicose veins of lower extremity, unspecified laterality, unspecified whether complicated [I83.90]

## 2024-05-20 ENCOUNTER — HOSPITAL ENCOUNTER (OUTPATIENT)
Dept: RADIOLOGY | Facility: HOSPITAL | Age: 69
Discharge: HOME | End: 2024-05-20
Attending: ORTHOPAEDIC SURGERY
Payer: MEDICARE

## 2024-05-20 ENCOUNTER — OFFICE VISIT (OUTPATIENT)
Dept: SPORTS MEDICINE | Facility: CLINIC | Age: 69
End: 2024-05-20
Payer: MEDICARE

## 2024-05-20 VITALS
DIASTOLIC BLOOD PRESSURE: 79 MMHG | BODY MASS INDEX: 25.62 KG/M2 | WEIGHT: 178.56 LBS | SYSTOLIC BLOOD PRESSURE: 128 MMHG | HEART RATE: 83 BPM

## 2024-05-20 DIAGNOSIS — G25.89 SCAPULAR DYSKINESIS: ICD-10-CM

## 2024-05-20 DIAGNOSIS — M75.21 BICEPS TENDONITIS ON RIGHT: Primary | ICD-10-CM

## 2024-05-20 DIAGNOSIS — M54.2 CERVICALGIA: ICD-10-CM

## 2024-05-20 DIAGNOSIS — M25.511 BILATERAL SHOULDER PAIN, UNSPECIFIED CHRONICITY: ICD-10-CM

## 2024-05-20 DIAGNOSIS — M25.512 BILATERAL SHOULDER PAIN, UNSPECIFIED CHRONICITY: ICD-10-CM

## 2024-05-20 PROCEDURE — 1126F AMNT PAIN NOTED NONE PRSNT: CPT | Mod: HCNC,CPTII,S$GLB, | Performed by: ORTHOPAEDIC SURGERY

## 2024-05-20 PROCEDURE — 3008F BODY MASS INDEX DOCD: CPT | Mod: HCNC,CPTII,S$GLB, | Performed by: ORTHOPAEDIC SURGERY

## 2024-05-20 PROCEDURE — 99999 PR PBB SHADOW E&M-EST. PATIENT-LVL III: CPT | Mod: PBBFAC,HCNC,, | Performed by: ORTHOPAEDIC SURGERY

## 2024-05-20 PROCEDURE — 3074F SYST BP LT 130 MM HG: CPT | Mod: HCNC,CPTII,S$GLB, | Performed by: ORTHOPAEDIC SURGERY

## 2024-05-20 PROCEDURE — 73030 X-RAY EXAM OF SHOULDER: CPT | Mod: 26,50,HCNC, | Performed by: RADIOLOGY

## 2024-05-20 PROCEDURE — 73030 X-RAY EXAM OF SHOULDER: CPT | Mod: TC,50,HCNC

## 2024-05-20 PROCEDURE — 3078F DIAST BP <80 MM HG: CPT | Mod: HCNC,CPTII,S$GLB, | Performed by: ORTHOPAEDIC SURGERY

## 2024-05-20 PROCEDURE — 1159F MED LIST DOCD IN RCRD: CPT | Mod: HCNC,CPTII,S$GLB, | Performed by: ORTHOPAEDIC SURGERY

## 2024-05-20 PROCEDURE — 99214 OFFICE O/P EST MOD 30 MIN: CPT | Mod: HCNC,S$GLB,, | Performed by: ORTHOPAEDIC SURGERY

## 2024-05-20 NOTE — PROGRESS NOTES
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      68 y.o. Male RHD reports that the pain is severe and not responding to any conservative care.      He notes right shoulder pain about 3 weeks ago with no issues for the past week  He has been resting from heavy and notes trying it again today and having no issues     He describes this pain as anterior shoulder pain     He reports that the pain is worse with overhead activity. It also bothers him at night.    Is affecting ADLs.     He has been performing his HEP regularly on his right shoulder     SANE: 100      Previously treated left shoulder in 2022 and notes completing physical therapy and continuing to do his physician directed HEP for both shoulders since then on a daily basis     Review of Systems   Constitution: Negative. Negative for chills, fever and night sweats.   HENT: Negative for congestion and headaches.    Eyes: Negative for blurred vision, left vision loss and right vision loss.   Cardiovascular: Negative for chest pain and syncope.   Respiratory: Negative for cough and shortness of breath.    Endocrine: Negative for polydipsia, polyphagia and polyuria.   Hematologic/Lymphatic: Negative for bleeding problem. Does not bruise/bleed easily.   Skin: Negative for dry skin, itching and rash.   Musculoskeletal: Negative for falls and muscle weakness.   Gastrointestinal: Negative for abdominal pain and bowel incontinence.   Genitourinary: Negative for bladder incontinence and nocturia.   Neurological: Negative for disturbances in coordination, loss of balance and seizures.   Psychiatric/Behavioral: Negative for depression. The patient does not have insomnia.    Allergic/Immunologic: Negative for hives and persistent infections.     PAST MEDICAL HISTORY:   Past Medical History:   Diagnosis Date    Hyperlipidemia      PAST SURGICAL HISTORY:   Past Surgical History:   Procedure Laterality Date    COLONOSCOPY N/A 4/9/2018    Procedure: COLONOSCOPY;   Surgeon: Tone Emmanuel MD;  Location: [facility] ENDO (4TH FLR);  Service: Endoscopy;  Laterality: N/A;    COLONOSCOPY N/A 3/14/2024    Procedure: COLONOSCOPY;  Surgeon: Margie Penn MD;  Location: [facility] ENDOSCOPY;  Service: Endoscopy;  Laterality: N/A;  Referral:  Leticia Andersen / PEG  / Inst emailed to [email or web address] - LW  3/7- lvm and portal msg for pc. DBM  3/12- pc complete. DBM     FAMILY HISTORY:   Family History   Problem Relation Name Age of Onset    Bladder Cancer Father      Pancreatic cancer Brother      Melanoma Neg Hx      Psoriasis Neg Hx      Lupus Neg Hx       SOCIAL HISTORY:   Social History     Socioeconomic History    Marital status: Single   Occupational History    Occupation: Restaurant     Employer: self   Tobacco Use    Smoking status: Never    Smokeless tobacco: Never   Substance and Sexual Activity    Alcohol use: Yes     Comment: Social    Drug use: No       MEDICATIONS:   Current Outpatient Medications:     atorvastatin (LIPITOR) 20 MG tablet, Take 1 tablet (20 mg total) by mouth once daily., Disp: 90 tablet, Rfl: 1    multivitamin with minerals tablet, Take 1 tablet by mouth once daily., Disp: , Rfl:     ascorbic acid, vitamin C, (VITAMIN C) 500 MG tablet, Take by mouth. 1 Tablet Oral Every day (Patient not taking: Reported on 4/18/2024), Disp: , Rfl:     b complex vitamins tablet, 1 Tablet Oral Every day (Patient not taking: Reported on 4/18/2024), Disp: , Rfl:     sildenafiL (VIAGRA) 50 MG tablet, 1 Tablet Oral Every day prn (Patient not taking: Reported on 4/18/2024), Disp: , Rfl:     tadalafiL (CIALIS) 5 MG tablet, Take by mouth. 1 Tablet Oral qd.  Dispense 1 box of Cialis for daily use. (Patient not taking: Reported on 4/18/2024), Disp: , Rfl:     triamcinolone acetonide 0.1% (KENALOG) 0.1 % cream, Apply topically 2 (two) times daily. Use to affected areas for up to 2 weeks then take a 1 week break or decrease to 3 times weekly. Do not apply to groin or face (Patient  not taking: Reported on 4/18/2024), Disp: 454 g, Rfl: 1  ALLERGIES: Review of patient's allergies indicates:  No Known Allergies    VITAL SIGNS: /79   Pulse 83   Wt 81 kg (178 lb 9.2 oz)   BMI 25.62 kg/m²      PHYSICAL EXAMINATION:  General:  The patient is alert and oriented x 3.  Mood is pleasant.  Observation of ears, eyes and nose reveal no gross abnormalities.  No labored breathing observed.  Gait is coordinated. Patient can toe walk and heel walk without difficulty.      right SHOULDER / UPPER EXTREMITY EXAM    OBSERVATION:     Swelling  none  Deformity  none   Discoloration  none   Scapular winging none   Scars   none  Atrophy  none    TENDERNESS / CREPITUS (T/C):          T/C      T/C   Clavicle   -/-  SUPRAspinatus    -/-     AC Jt.    -/-  INFRAspinatus  -/-    SC Jt.    -/-  Deltoid    -/-      G. Tuberosity  -/-  LH BICEP groove  +/-   Acromion:  -/-  Midline Neck   -/-     Scapular Spine -/-  Trapezium   -/-   SMA Scapula  -/-  GH jt. line - post  -/-     Scapulothoracic  -/-         ROM: (* = with pain)  Right shoulder   Left shoulder        AROM (PROM)   AROM (PROM)   FE    165° (170°)     165° (170°)     ER at 0°    60°  (65°)    60°  (65°)   ER at 90° ABD  90°  (90°)    90°  (90°)   IR at 90°  ABD   NA  (40°)     NA  (40°)      IR (spine level)   T10     T10    STRENGTH: (* = with pain) RIGHT SHOULDER  LEFT SHOULDER   SCAPTION at 0  5/5    5/5   SCAPTION at 30  5/5    5/5    IR    5/5    5/5   ER    5/5    5/5   BICEPS   5/5    5/5   Deltoid    5/5    5/5     SIGNS:  Painful side       NEER   neg    OCOLINS  neg    EVANS   neg    SPEEDS  +     DROP ARM   neg   BELLY PRESS neg   Superior escape none    LIFT-OFF  neg   X-Body ADD    neg    MOVING VALGUS neg        STABILITY TESTING    RIGHT SHOULDER   LEFT SHOULDER     Translation     Anterior  up face     up face    Posterior  up face    up face    Sulcus   < 10mm    < 10 mm     Signs   Apprehension   neg      neg       Relocation    no change     no change      Jerk test  neg     neg    EXTREMITY NEURO-VASCULAR EXAM    Sensation grossly intact to light touch all dermatomal regions.    DTR 2+ Biceps, Triceps, BR and Negative Farrahs sign   Grossly intact motor function at Elbow, Wrist and Hand   Distal pulses radial and ulnar 2+, brisk cap refill, symmetric.      NECK:  Painless FROM and spinous processes non-tender. Negative Spurlings sign.      OTHER FINDINGS:  + scapular dyskinesia    XRAYS reviewed and interpreted personally by me:  Shoulder trauma series right,  were obtained and reviewed  Minimal mild DJD AC joint with focal 5 mm size superior left AC joint remote calcification. Minimal mild DJD glenohumeral joint appearing more prominent on right femoral head than contralateral side with focal 5 mm size osteochondral defect posterior central humeral head at posterior glenoid and anterior humeral head at greater tuberosity region.           ASSESSMENT:   right:  1. Shoulder pain, biceps tendonitis    2.  Scapular dyskinesia    PLAN:      PT for scapular stabilization: Scapular dyskinesia   Scapular stabilization - Val protocol, 1-3x/week x 6-8 weeks with HEP    Follow up as needed     All questions were answered, pt will contact us for questions or concerns in the interim.    I made the decision to obtain old records of the patient including previous notes and imaging. New imaging was ordered today of the extremity or extremities evaluated. I independently reviewed and interpreted the radiographs and/or MRIs today as well as prior imaging.

## 2024-05-23 ENCOUNTER — CLINICAL SUPPORT (OUTPATIENT)
Dept: REHABILITATION | Facility: HOSPITAL | Age: 69
End: 2024-05-23
Attending: ORTHOPAEDIC SURGERY
Payer: MEDICARE

## 2024-05-23 DIAGNOSIS — M75.21 BICEPS TENDONITIS ON RIGHT: ICD-10-CM

## 2024-05-23 DIAGNOSIS — M25.511 ACUTE PAIN OF RIGHT SHOULDER: Primary | ICD-10-CM

## 2024-05-23 DIAGNOSIS — G25.89 SCAPULAR DYSKINESIS: ICD-10-CM

## 2024-05-23 PROCEDURE — 97161 PT EVAL LOW COMPLEX 20 MIN: CPT | Mod: HCNC | Performed by: PHYSICAL THERAPIST

## 2024-05-23 PROCEDURE — 97112 NEUROMUSCULAR REEDUCATION: CPT | Mod: HCNC | Performed by: PHYSICAL THERAPIST
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
## 2024-06-10 ENCOUNTER — PATIENT MESSAGE (OUTPATIENT)
Dept: INTERNAL MEDICINE | Facility: CLINIC | Age: 69
End: 2024-06-10
Payer: MEDICARE

## 2024-06-12 ENCOUNTER — HOSPITAL ENCOUNTER (OUTPATIENT)
Dept: RADIOLOGY | Facility: OTHER | Age: 69
Discharge: HOME | End: 2024-06-12
Attending: PHYSICIAN ASSISTANT
Payer: MEDICARE

## 2024-06-12 DIAGNOSIS — M54.2 CERVICALGIA: ICD-10-CM

## 2024-06-12 PROCEDURE — 72141 MRI NECK SPINE W/O DYE: CPT | Mod: TC,HCNC

## 2024-06-12 PROCEDURE — 72141 MRI NECK SPINE W/O DYE: CPT | Mod: 26,HCNC,, | Performed by: RADIOLOGY

## 2024-07-02 ENCOUNTER — HOSPITAL ENCOUNTER (OUTPATIENT)
Dept: RADIOLOGY | Facility: OTHER | Age: 69
Discharge: HOME | End: 2024-07-02
Attending: SURGERY
Payer: MEDICARE

## 2024-07-02 DIAGNOSIS — I80.03 PHLEBITIS AND THROMBOPHLEBITIS OF SUPERFICIAL VESSELS OF LOWER EXTREMITIES, BILATERAL: ICD-10-CM

## 2024-07-02 DIAGNOSIS — M79.606 PAIN AND SWELLING OF LOWER EXTREMITY: ICD-10-CM

## 2024-07-02 DIAGNOSIS — M79.89 PAIN AND SWELLING OF LOWER EXTREMITY: ICD-10-CM

## 2024-07-02 PROCEDURE — 93970 EXTREMITY STUDY: CPT | Mod: 26,HCNC,, | Performed by: RADIOLOGY

## 2024-07-02 PROCEDURE — 93970 EXTREMITY STUDY: CPT | Mod: TC,HCNC

## 2024-07-22 ENCOUNTER — HOSPITAL ENCOUNTER (OUTPATIENT)
Dept: RADIOLOGY | Facility: HOSPITAL | Age: 69
Discharge: HOME | End: 2024-07-22
Attending: INTERNAL MEDICINE
Payer: MEDICARE

## 2024-07-22 ENCOUNTER — OFFICE VISIT (OUTPATIENT)
Dept: INTERNAL MEDICINE | Facility: CLINIC | Age: 69
End: 2024-07-22
Payer: MEDICARE

## 2024-07-22 DIAGNOSIS — R11.2 NAUSEA AND VOMITING, UNSPECIFIED VOMITING TYPE: Primary | ICD-10-CM

## 2024-07-22 DIAGNOSIS — R05.9 COUGH, UNSPECIFIED TYPE: ICD-10-CM

## 2024-07-22 DIAGNOSIS — E78.5 HYPERLIPIDEMIA, UNSPECIFIED HYPERLIPIDEMIA TYPE: ICD-10-CM

## 2024-07-22 DIAGNOSIS — M48.02 CERVICAL SPINAL STENOSIS: ICD-10-CM

## 2024-07-22 PROCEDURE — 99999 PR PBB SHADOW E&M-EST. PATIENT-LVL IV: CPT | Mod: PBBFAC,HCNC,, | Performed by: INTERNAL MEDICINE

## 2024-07-22 PROCEDURE — 1101F PT FALLS ASSESS-DOCD LE1/YR: CPT | Mod: HCNC,CPTII,S$GLB, | Performed by: INTERNAL MEDICINE

## 2024-07-22 PROCEDURE — 1126F AMNT PAIN NOTED NONE PRSNT: CPT | Mod: HCNC,CPTII,S$GLB, | Performed by: INTERNAL MEDICINE

## 2024-07-22 PROCEDURE — 71046 X-RAY EXAM CHEST 2 VIEWS: CPT | Mod: TC,HCNC

## 2024-07-22 PROCEDURE — 99214 OFFICE O/P EST MOD 30 MIN: CPT | Mod: HCNC,S$GLB,, | Performed by: INTERNAL MEDICINE

## 2024-07-22 PROCEDURE — 3075F SYST BP GE 130 - 139MM HG: CPT | Mod: HCNC,CPTII,S$GLB, | Performed by: INTERNAL MEDICINE

## 2024-07-22 PROCEDURE — 3288F FALL RISK ASSESSMENT DOCD: CPT | Mod: HCNC,CPTII,S$GLB, | Performed by: INTERNAL MEDICINE

## 2024-07-22 PROCEDURE — 1159F MED LIST DOCD IN RCRD: CPT | Mod: HCNC,CPTII,S$GLB, | Performed by: INTERNAL MEDICINE

## 2024-07-22 PROCEDURE — G2211 COMPLEX E/M VISIT ADD ON: HCPCS | Mod: HCNC,S$GLB,, | Performed by: INTERNAL MEDICINE

## 2024-07-22 PROCEDURE — 71046 X-RAY EXAM CHEST 2 VIEWS: CPT | Mod: 26,HCNC,, | Performed by: RADIOLOGY

## 2024-07-22 PROCEDURE — 3078F DIAST BP <80 MM HG: CPT | Mod: HCNC,CPTII,S$GLB, | Performed by: INTERNAL MEDICINE

## 2024-07-22 PROCEDURE — 3008F BODY MASS INDEX DOCD: CPT | Mod: HCNC,CPTII,S$GLB, | Performed by: INTERNAL MEDICINE

## 2024-07-24 ENCOUNTER — OFFICE VISIT (OUTPATIENT)
Dept: VASCULAR SURGERY | Facility: CLINIC | Age: 69
End: 2024-07-24
Payer: MEDICARE

## 2024-07-24 VITALS
HEART RATE: 62 BPM | DIASTOLIC BLOOD PRESSURE: 72 MMHG | TEMPERATURE: 99 F | WEIGHT: 176.56 LBS | SYSTOLIC BLOOD PRESSURE: 130 MMHG | BODY MASS INDEX: 24.72 KG/M2 | HEIGHT: 71 IN | OXYGEN SATURATION: 98 %

## 2024-07-24 VITALS — HEART RATE: 67 BPM | SYSTOLIC BLOOD PRESSURE: 149 MMHG | DIASTOLIC BLOOD PRESSURE: 81 MMHG

## 2024-07-24 DIAGNOSIS — I83.90 VARICOSE VEINS OF LOWER EXTREMITY, UNSPECIFIED LATERALITY, UNSPECIFIED WHETHER COMPLICATED: ICD-10-CM

## 2024-07-24 DIAGNOSIS — I87.2 VENOUS INSUFFICIENCY: Primary | ICD-10-CM

## 2024-07-24 LAB — LIPASE SERPL-CCNC: 53 U/L (ref 7–60)

## 2024-07-24 PROCEDURE — 1101F PT FALLS ASSESS-DOCD LE1/YR: CPT | Mod: CPTII,S$GLB,, | Performed by: SURGERY

## 2024-07-24 PROCEDURE — 99204 OFFICE O/P NEW MOD 45 MIN: CPT | Mod: S$GLB,,, | Performed by: SURGERY

## 2024-07-24 PROCEDURE — 3079F DIAST BP 80-89 MM HG: CPT | Mod: CPTII,S$GLB,, | Performed by: SURGERY

## 2024-07-24 PROCEDURE — 3077F SYST BP >= 140 MM HG: CPT | Mod: CPTII,S$GLB,, | Performed by: SURGERY

## 2024-07-24 PROCEDURE — 1159F MED LIST DOCD IN RCRD: CPT | Mod: CPTII,S$GLB,, | Performed by: SURGERY

## 2024-07-24 PROCEDURE — 1126F AMNT PAIN NOTED NONE PRSNT: CPT | Mod: CPTII,S$GLB,, | Performed by: SURGERY

## 2024-07-24 PROCEDURE — 1160F RVW MEDS BY RX/DR IN RCRD: CPT | Mod: CPTII,S$GLB,, | Performed by: SURGERY

## 2024-07-24 PROCEDURE — 3288F FALL RISK ASSESSMENT DOCD: CPT | Mod: CPTII,S$GLB,, | Performed by: SURGERY

## 2024-07-24 NOTE — PROGRESS NOTES
Subjective:       Patient ID: Matthew Pisano is a 68 y.o. male.    Chief Complaint: Hyperlipidemia    HPI  He returns for management of hyperlipidemia.  He has had hyperlipidemia for over a year.  Current treatment has included medications outlined medication list.  Complains of persistent neck pain and tingling.  3/10 in intensity.  He also complains of intermittent nausea as well as call     Medications: See medication list     Social history: Does not smoke, does not drink alcohol       Review of Systems   Constitutional:  Negative for chills, fatigue, fever and unexpected weight change.   Respiratory:  Negative for chest tightness and shortness of breath.    Cardiovascular:  Negative for chest pain and palpitations.   Gastrointestinal:  Negative for abdominal pain and blood in stool.   Neurological:  Negative for dizziness, syncope, numbness and headaches.       Objective:      Physical Exam  HENT:      Right Ear: External ear normal.      Left Ear: External ear normal.      Nose: Nose normal.      Mouth/Throat:      Mouth: Mucous membranes are moist.      Pharynx: Oropharynx is clear.   Eyes:      Pupils: Pupils are equal, round, and reactive to light.   Cardiovascular:      Rate and Rhythm: Normal rate and regular rhythm.      Heart sounds: No murmur heard.  Pulmonary:      Breath sounds: Normal breath sounds.   Abdominal:      General: There is no distension.      Palpations: There is no hepatomegaly.      Tenderness: There is no abdominal tenderness.   Musculoskeletal:      Cervical back: Normal range of motion.   Lymphadenopathy:      Cervical: No cervical adenopathy.      Upper Body:      Right upper body: No axillary adenopathy.      Left upper body: No axillary adenopathy.   Neurological:      Cranial Nerves: No cranial nerve deficit.      Sensory: No sensory deficit.      Motor: Motor function is intact.      Deep Tendon Reflexes: Reflexes are normal and symmetric.         Assessment/Plan        Assessment and plan: 1.  Hyperlipidemia: Controlled.  Continue Lipitor.  Check CMP, lipid panel, CBC   2.  Cervical spinal stenosis:  Schedule spine clinic appointment  3.  Nausea: Check lipase.  Schedule CT scan abdomen   4.  Cough: Check chest x-ray    Visit today included increased complexity associated with the care of the episodic problem hyperlipidemia addressed and managing the longitudinal care of the patient due to the serious and/or complex managed problem(s) hyperlipidemia, nausea.

## 2024-07-24 NOTE — PROGRESS NOTES
Mehdi Kumar MD, RPVI                                 [facility]                           [facility]                             07/24/2024    HPI:  Mattehw Pisano is a 68 y.o. male with   Patient Active Problem List   Diagnosis    Other and unspecified hyperlipidemia    Epigastric abdominal pain    Sebaceous cyst of right axilla    Special screening for malignant neoplasms, colon    Acute pain of left shoulder    Acute pain of right shoulder    being managed by PCP and specialists who is here today for evaluation of LLE varicose veins.  Patient states location is LLE occurring for yrs.  Associated signs and symptoms include edema.  Quality is dull and severity is 1/10.  Symptoms began yrs ago.  Alleviating factors include elevation.  Worsening factors include dependency.  Patient has not been wearing compression stockings for greater than 3 months.  +FH of venous disease.  Symptoms do limit patient's functional status and daily activities.  no DVT history.  no venous interventions.  no low sodium diet.  no excessive water intake.    Migraine with aura: no  PFO/ASD/right to left shunt: no  Pregnant: no  Breastfeeding: no    no MI  no Stroke  Tobacco use: denies    Past Medical History:   Diagnosis Date    Hyperlipidemia      Past Surgical History:   Procedure Laterality Date    COLONOSCOPY N/A 4/9/2018    Procedure: COLONOSCOPY;  Surgeon: Tone Emmanuel MD;  Location: [address]);  Service: Endoscopy;  Laterality: N/A;    COLONOSCOPY N/A 3/14/2024    Procedure: COLONOSCOPY;  Surgeon: Margie Penn MD;  Location: [facility] ENDOSCOPY;  Service: Endoscopy;  Laterality: N/A;  Referral:  Leticia Andersen / [facility]  / Inst emailed to [email or web address]  3/7- lvm and portal msg for pc. DBM  3/12- pc complete. DBM     Family History   Problem Relation Name Age of Onset    Bladder Cancer Father      Pancreatic cancer Brother      Melanoma Neg Hx      Psoriasis Neg Hx       Lupus Neg Hx       Social History     Socioeconomic History    Marital status: Single   Occupational History    Occupation: Restaurant     Employer: self   Tobacco Use    Smoking status: Never    Smokeless tobacco: Never   Substance and Sexual Activity    Alcohol use: Yes     Comment: Social    Drug use: No       Current Outpatient Medications:     atorvastatin (LIPITOR) 20 MG tablet, Take 1 tablet (20 mg total) by mouth once daily., Disp: 90 tablet, Rfl: 1    b complex vitamins tablet, 1 Tablet Oral Every day, Disp: , Rfl:     multivitamin with minerals tablet, Take 1 tablet by mouth once daily., Disp: , Rfl:     sildenafiL (VIAGRA) 50 MG tablet, 1 Tablet Oral Every day prn, Disp: , Rfl:     tadalafiL (CIALIS) 5 MG tablet, Take by mouth. 1 Tablet Oral qd.  Dispense 1 box of Cialis for daily use., Disp: , Rfl:     triamcinolone acetonide 0.1% (KENALOG) 0.1 % cream, Apply topically 2 (two) times daily. Use to affected areas for up to 2 weeks then take a 1 week break or decrease to 3 times weekly. Do not apply to groin or face, Disp: 454 g, Rfl: 1    REVIEW OF SYSTEMS:  General: No fevers or chills; ENT: No sore throat; Allergy and Immunology: no persistent infections; Hematological and Lymphatic: No history of bleeding or easy bruising; Endocrine: negative; Respiratory: no cough, shortness of breath, or wheezing; Cardiovascular: no chest pain or dyspnea on exertion; Gastrointestinal: no abdominal pain/back, change in bowel habits, or bloody stools; Genito-Urinary: no dysuria, trouble voiding, or hematuria; Musculoskeletal: edema; Neurological: no TIA or stroke symptoms; Psychiatric: no nervousness, anxiety or depression.    PHYSICAL EXAM:      Pulse: 67         General appearance:  Alert, well-appearing, and in no distress.  Oriented to person, place, and time                    Neurological: Normal speech, no focal findings noted; CN II - XII grossly intact. RLE with sensation to light touch, LLE with sensation to  "light touch.            Musculoskeletal: Digits/nail without cyanosis/clubbing.  Strength 5/5 BLE.                    Neck: Supple, no significant adenopathy                  Chest:  No wheezes, symmetric air entry. No use of accessory muscles               Cardiac: Normal rate and regular rhythm            Abdomen: Soft, nontender, nondistended      Extremities:   2+ R DP pulse, 2+ L DP pulse      no RLE edema, no LLE edema    Skin:  RLE no ulcer; LLE no ulcer      RLE no spider veins, LLE + spider veins      RLE no varicose veins, LLE + varicose veins    CEAP 0/2    VCSS 2    LAB RESULTS:  No results found for: "CBC"  No results found for: "LABPROT", "INR"  Lab Results   Component Value Date     04/18/2024    K 4.4 04/18/2024     04/18/2024    CO2 27 04/18/2024    GLU 79 04/18/2024    BUN 18 04/18/2024    CREATININE 1.0 04/18/2024    CALCIUM 9.0 04/18/2024    ANIONGAP 7 (L) 04/18/2024    EGFRNONAA >60.0 01/26/2021     Lab Results   Component Value Date    WBC 6.49 04/18/2024    RBC 4.98 04/18/2024    HGB 15.5 04/18/2024    HCT 46.8 04/18/2024    MCV 94 04/18/2024    MCH 31.1 (H) 04/18/2024    MCHC 33.1 04/18/2024    RDW 12.9 04/18/2024     04/18/2024    MPV 9.9 04/18/2024    GRAN 4.0 04/18/2024    GRAN 61.2 04/18/2024    LYMPH 1.8 04/18/2024    LYMPH 27.6 04/18/2024    MONO 0.6 04/18/2024    MONO 8.6 04/18/2024    EOS 0.1 04/18/2024    BASO 0.07 04/18/2024    EOSINOPHIL 1.2 04/18/2024    BASOPHIL 1.1 04/18/2024    DIFFMETHOD Automated 04/18/2024     .No results found for: "HGBA1C"    IMAGING:  All pertinent imaging has been reviewed and interpreted independently.    Venous US 7/2024 Impression:  FINDINGS:  No evidence of DVT in either lower extremity deep venous system.     On the right, the greater saphenous vein has maximum diameter 7 mm without hemodynamically significant reflux.  The lesser saphenous vein has maximum diameter of 4 mm without hemodynamically significant reflux.  The anterior " accessory saphenous vein has maximum diameter of 4 mm without hemodynamically significant reflux.     On the left, the greater saphenous vein has maximum diameter of 8 mm.  There is hemodynamically significant reflux identified at the level of the knee and calf.  The lesser saphenous vein has maximum diameter of 4 mm without hemodynamically significant reflux.  The anterior accessory saphenous vein has maximum diameter of 3 mm without hemodynamically significant reflux.     Impression:     No evidence of DVT in either lower extremity deep venous system.     No evidence of hemodynamically significant reflux in the right superficial venous system.     Hemodynamically significant reflux identified in the left greater saphenous vein at the level of the knee and calf.          IMP/PLAN:  68 y.o. male with   Patient Active Problem List   Diagnosis    Other and unspecified hyperlipidemia    Epigastric abdominal pain    Sebaceous cyst of right axilla    Special screening for malignant neoplasms, colon    Acute pain of left shoulder    Acute pain of right shoulder    being managed by PCP and specialists who is here today for evaluation of LLE varicose veins.    -recommend compression with Rx stockings, elevation, dietary changes associated with water and sodium intake discussed at length with patient  -Exercise   -RTC 1-2 months for further evaluation of L GSV EVLT and stab phlebectomy    I spent 12 minutes evaluating this patient and greater than 50% of the time was spent counseling, coordinator care and discussing the plan of care.  All questions were answered and patient stated understanding with agreement with the above treatment plan.    Mehdi Kumar MD [facility]  Vascular and Endovascular Surgery

## 2024-07-25 ENCOUNTER — HOSPITAL ENCOUNTER (OUTPATIENT)
Dept: RADIOLOGY | Facility: HOSPITAL | Age: 69
Discharge: HOME | End: 2024-07-25
Attending: INTERNAL MEDICINE
Payer: MEDICARE

## 2024-07-25 ENCOUNTER — HOSPITAL ENCOUNTER (OUTPATIENT)
Dept: RADIOLOGY | Facility: HOSPITAL | Age: 69
Discharge: HOME | End: 2024-07-25
Attending: ORTHOPAEDIC SURGERY
Payer: MEDICARE

## 2024-07-25 DIAGNOSIS — M50.30 DDD (DEGENERATIVE DISC DISEASE), CERVICAL: ICD-10-CM

## 2024-07-25 DIAGNOSIS — R11.2 NAUSEA AND VOMITING, UNSPECIFIED VOMITING TYPE: ICD-10-CM

## 2024-07-25 PROCEDURE — 72050 X-RAY EXAM NECK SPINE 4/5VWS: CPT | Mod: 26,HCNC,, | Performed by: RADIOLOGY

## 2024-07-25 PROCEDURE — 74177 CT ABD & PELVIS W/CONTRAST: CPT | Mod: 26,HCNC,, | Performed by: RADIOLOGY

## 2024-07-25 PROCEDURE — A9698 NON-RAD CONTRAST MATERIALNOC: HCPCS | Mod: HCNC | Performed by: INTERNAL MEDICINE

## 2024-07-25 PROCEDURE — 25500020 PHARM REV CODE 255: Mod: HCNC | Performed by: INTERNAL MEDICINE

## 2024-07-25 PROCEDURE — 74177 CT ABD & PELVIS W/CONTRAST: CPT | Mod: TC,HCNC

## 2024-07-25 PROCEDURE — 72050 X-RAY EXAM NECK SPINE 4/5VWS: CPT | Mod: TC,HCNC

## 2024-07-25 RX ADMIN — BARIUM SULFATE 450 ML: 20 SUSPENSION ORAL at 09:07

## 2024-07-25 RX ADMIN — IOHEXOL 75 ML: 350 INJECTION, SOLUTION INTRAVENOUS at 09:07

## 2024-08-06 ENCOUNTER — OFFICE VISIT (OUTPATIENT)
Dept: ORTHOPEDICS | Facility: CLINIC | Age: 69
End: 2024-08-06
Payer: MEDICARE

## 2024-08-06 VITALS — WEIGHT: 177.94 LBS | BODY MASS INDEX: 24.81 KG/M2

## 2024-08-06 DIAGNOSIS — M48.02 CERVICAL SPINAL STENOSIS: ICD-10-CM

## 2024-08-06 PROCEDURE — 1159F MED LIST DOCD IN RCRD: CPT | Mod: HCNC,CPTII,S$GLB, | Performed by: ORTHOPAEDIC SURGERY

## 2024-08-06 PROCEDURE — 99214 OFFICE O/P EST MOD 30 MIN: CPT | Mod: HCNC,S$GLB,, | Performed by: ORTHOPAEDIC SURGERY

## 2024-08-06 PROCEDURE — 99999 PR PBB SHADOW E&M-EST. PATIENT-LVL II: CPT | Mod: PBBFAC,HCNC,, | Performed by: ORTHOPAEDIC SURGERY

## 2024-08-06 PROCEDURE — 1126F AMNT PAIN NOTED NONE PRSNT: CPT | Mod: HCNC,CPTII,S$GLB, | Performed by: ORTHOPAEDIC SURGERY

## 2024-08-06 PROCEDURE — 3008F BODY MASS INDEX DOCD: CPT | Mod: HCNC,CPTII,S$GLB, | Performed by: ORTHOPAEDIC SURGERY

## 2024-08-06 RX ORDER — PREGABALIN 75 MG/1
75 CAPSULE ORAL 2 TIMES DAILY
Qty: 60 CAPSULE | Refills: 5 | Status: SHIPPED | OUTPATIENT
Start: 2024-08-06 | End: 2025-02-04

## 2024-09-17 ENCOUNTER — TELEPHONE (OUTPATIENT)
Dept: INTERNAL MEDICINE | Facility: CLINIC | Age: 69
End: 2024-09-17
Payer: MEDICARE

## 2024-09-17 DIAGNOSIS — R20.2 PARESTHESIA: Primary | ICD-10-CM

## 2024-09-17 NOTE — TELEPHONE ENCOUNTER
----- Message from Leticia Lane sent at 9/16/2024  1:34 PM CDT -----  Type:  Sooner Apoointment Request    Caller is requesting a sooner appointment.  Caller declined first available appointment listed below.  Caller will not accept being placed on the waitlist and is requesting a message be sent to doctor.  Name of Caller: Pt  When is the first available appointment?  Symptoms: referral to neurologist for tingling in neck and spine  Would the patient rather a call back or a response via [facility]? Call  Best Call Back Number:  [phone]  Additional Information:

## 2024-10-03 ENCOUNTER — TELEPHONE (OUTPATIENT)
Dept: NEUROLOGY | Facility: CLINIC | Age: 69
End: 2024-10-03
Payer: MEDICARE

## 2024-10-03 NOTE — TELEPHONE ENCOUNTER
Called and spoke with patient.  He informed me that Gen Neuro reach out to him on yesterday to reschedule the appt 10/10/24      Patient scheduled incorrectly per Kasia   Appointment 10/10/24.  Appointment will be rescheduled with Gen Neuro

## 2024-10-07 ENCOUNTER — TELEPHONE (OUTPATIENT)
Dept: INTERNAL MEDICINE | Facility: CLINIC | Age: 69
End: 2024-10-07
Payer: MEDICARE

## 2024-10-07 NOTE — TELEPHONE ENCOUNTER
Called pt. Pt states lump on front right of his throat. Pt says there is no pain. Pt states itching on his neck has been going on for 8-10 months. He is just now noticing lump but itching has persisted for months. Pt is not doing anything to treat it. Made appointment with Chelsie Farr on 10/9.

## 2024-10-09 ENCOUNTER — OFFICE VISIT (OUTPATIENT)
Dept: INTERNAL MEDICINE | Facility: CLINIC | Age: 69
End: 2024-10-09
Payer: MEDICARE

## 2024-10-09 ENCOUNTER — HOSPITAL ENCOUNTER (OUTPATIENT)
Dept: RADIOLOGY | Facility: HOSPITAL | Age: 69
Discharge: HOME | End: 2024-10-09
Attending: PHYSICIAN ASSISTANT
Payer: MEDICARE

## 2024-10-09 VITALS
HEART RATE: 61 BPM | SYSTOLIC BLOOD PRESSURE: 154 MMHG | BODY MASS INDEX: 25.28 KG/M2 | WEIGHT: 180.56 LBS | DIASTOLIC BLOOD PRESSURE: 90 MMHG | OXYGEN SATURATION: 99 % | HEIGHT: 71 IN

## 2024-10-09 DIAGNOSIS — R22.1 NECK NODULE: ICD-10-CM

## 2024-10-09 DIAGNOSIS — R22.1 NECK NODULE: Primary | ICD-10-CM

## 2024-10-09 PROCEDURE — 99999 PR PBB SHADOW E&M-EST. PATIENT-LVL IV: CPT | Mod: PBBFAC,HCNC,, | Performed by: PHYSICIAN ASSISTANT

## 2024-10-09 PROCEDURE — 1159F MED LIST DOCD IN RCRD: CPT | Mod: HCNC,CPTII,S$GLB, | Performed by: PHYSICIAN ASSISTANT

## 2024-10-09 PROCEDURE — 99214 OFFICE O/P EST MOD 30 MIN: CPT | Mod: HCNC,S$GLB,, | Performed by: PHYSICIAN ASSISTANT

## 2024-10-09 PROCEDURE — 3288F FALL RISK ASSESSMENT DOCD: CPT | Mod: HCNC,CPTII,S$GLB, | Performed by: PHYSICIAN ASSISTANT

## 2024-10-09 PROCEDURE — 76536 US EXAM OF HEAD AND NECK: CPT | Mod: TC,HCNC

## 2024-10-09 PROCEDURE — 3008F BODY MASS INDEX DOCD: CPT | Mod: HCNC,CPTII,S$GLB, | Performed by: PHYSICIAN ASSISTANT

## 2024-10-09 PROCEDURE — 3080F DIAST BP >= 90 MM HG: CPT | Mod: HCNC,CPTII,S$GLB, | Performed by: PHYSICIAN ASSISTANT

## 2024-10-09 PROCEDURE — 1126F AMNT PAIN NOTED NONE PRSNT: CPT | Mod: HCNC,CPTII,S$GLB, | Performed by: PHYSICIAN ASSISTANT

## 2024-10-09 PROCEDURE — 76536 US EXAM OF HEAD AND NECK: CPT | Mod: 26,HCNC,, | Performed by: RADIOLOGY

## 2024-10-09 PROCEDURE — 1101F PT FALLS ASSESS-DOCD LE1/YR: CPT | Mod: HCNC,CPTII,S$GLB, | Performed by: PHYSICIAN ASSISTANT

## 2024-10-09 PROCEDURE — 3077F SYST BP >= 140 MM HG: CPT | Mod: HCNC,CPTII,S$GLB, | Performed by: PHYSICIAN ASSISTANT

## 2024-10-09 RX ORDER — TESTOSTERONE CYPIONATE 200 MG/ML
200 INJECTION, SOLUTION INTRAMUSCULAR
COMMUNITY
Start: 2024-05-22 | End: 2025-02-26

## 2024-10-09 NOTE — PROGRESS NOTES
INTERNAL MEDICINE URGENT VISIT NOTE    CHIEF COMPLAINT     Chief Complaint   Patient presents with    Mass     On throat       HPI     Matthew Pisano is a 69 y.o. male who presents for an urgent visit today.    PCP is Leticia Andersen MD, patient is known to me.     He has a palpable nodule to the right side of neck  Felt for a about a month   No URI symptoms   No GERD symptoms   No globus sensation, denies dysphagia or choking.   Only felt when he scratches - has chronic pruritus on the right side of his face, seeing neurology about this soon.   Not painful   Not changing in size   No history of cigarette smoking.   No overlying skin changes    Past Medical History:  Past Medical History:   Diagnosis Date    Hyperlipidemia        Home Medications:  Prior to Admission medications    Medication Sig Start Date End Date Taking? Authorizing Provider   atorvastatin (LIPITOR) 20 MG tablet Take 1 tablet (20 mg total) by mouth once daily. 11/7/23 11/6/24 Yes Leticia Andersen MD   multivitamin with minerals tablet Take 1 tablet by mouth once daily.   Yes Provider, Historical   sildenafiL (VIAGRA) 50 MG tablet 1 Tablet Oral Every day prn   Yes Provider, Historical   tadalafiL (CIALIS) 5 MG tablet Take by mouth. 1 Tablet Oral qd.  Dispense 1 box of Cialis for daily use.   Yes Provider, Historical   testosterone cypionate (DEPOTESTOTERONE CYPIONATE) 200 mg/mL injection Inject 200 mg into the muscle. 5/22/24 2/26/25 Yes Provider, Historical   b complex vitamins tablet 1 Tablet Oral Every day  Patient not taking: Reported on 10/9/2024    Provider, Historical   pregabalin (LYRICA) 75 MG capsule Take 1 capsule (75 mg total) by mouth 2 (two) times daily.  Patient not taking: Reported on 10/9/2024 8/6/24 2/4/25  Rosario Gupta PA-C   triamcinolone acetonide 0.1% (KENALOG) 0.1 % cream Apply topically 2 (two) times daily. Use to affected areas for up to 2 weeks then take a 1 week break or decrease to 3 times weekly. Do not  "apply to groin or face  Patient not taking: Reported on 10/9/2024 9/8/23   Kuldip Winslow MD       Review of Systems:  Review of Systems   Constitutional:  Negative for chills and fever.   HENT:  Negative for sore throat and trouble swallowing.    Eyes:  Negative for visual disturbance.   Respiratory:  Negative for cough and shortness of breath.    Cardiovascular:  Negative for chest pain.   Gastrointestinal:  Negative for abdominal pain, constipation, diarrhea, nausea and vomiting.   Genitourinary:  Negative for dysuria and flank pain.   Musculoskeletal:  Negative for back pain, neck pain and neck stiffness.        Nodules to neck    Skin:  Negative for rash.   Neurological:  Negative for dizziness, syncope, weakness and headaches.   Psychiatric/Behavioral:  Negative for confusion.        Health Maintainence:   Immunizations:  Health Maintenance         Date Due Completion Date    Hepatitis C Screening Never done ---    Hemoglobin A1c (Diabetic Prevention Screening) Never done ---    Shingles Vaccine (1 of 2) Never done ---    RSV Vaccine (Age 60+ and Pregnant patients) (1 - Risk 60-74 years 1-dose series) Never done ---    TETANUS VACCINE 04/06/2020 4/6/2010    Pneumococcal Vaccines (Age 65+) (1 of 1 - PCV) Never done ---    Influenza Vaccine (1) Never done ---    COVID-19 Vaccine (1 - 2024-25 season) Never done ---    Colorectal Cancer Screening 03/14/2027 3/14/2024    Lipid Panel 07/23/2029 7/23/2024             PHYSICAL EXAM     BP (!) 154/90 (BP Location: Left arm, Patient Position: Sitting)   Pulse 61   Ht 5' 11" (1.803 m)   Wt 81.9 kg (180 lb 8.9 oz)   SpO2 99%   BMI 25.18 kg/m²     Physical Exam  Vitals and nursing note reviewed.   Constitutional:       Appearance: Normal appearance.      Comments: Healthy appearing male in NAD or apparent pain. He makes good eye contact, speaks in clear full sentences and ambulates with ease.        HENT:      Head: Normocephalic and atraumatic.      Nose: Nose " "normal.      Mouth/Throat:      Pharynx: Oropharynx is clear.   Eyes:      Conjunctiva/sclera: Conjunctivae normal.   Neck:      Comments: Right sided palpable nodule in region of anterior cervical chain of nodes.   Non-tender, firm   Cardiovascular:      Rate and Rhythm: Normal rate and regular rhythm.      Pulses: Normal pulses.   Pulmonary:      Effort: No respiratory distress.   Abdominal:      Tenderness: There is no abdominal tenderness.   Musculoskeletal:         General: Normal range of motion.      Cervical back: No rigidity.   Skin:     General: Skin is warm and dry.      Capillary Refill: Capillary refill takes less than 2 seconds.      Findings: No rash.   Neurological:      General: No focal deficit present.      Mental Status: He is alert.      Gait: Gait normal.   Psychiatric:         Mood and Affect: Mood normal.         LABS     No results found for: "LABA1C", "HGBA1C"  CMP  Sodium   Date Value Ref Range Status   04/18/2024 140 136 - 145 mmol/L Final     Potassium   Date Value Ref Range Status   04/18/2024 4.4 3.5 - 5.1 mmol/L Final     Chloride   Date Value Ref Range Status   04/18/2024 106 95 - 110 mmol/L Final     CO2   Date Value Ref Range Status   04/18/2024 27 23 - 29 mmol/L Final     Glucose   Date Value Ref Range Status   04/18/2024 79 70 - 110 mg/dL Final     BUN   Date Value Ref Range Status   04/18/2024 18 8 - 23 mg/dL Final     Creatinine   Date Value Ref Range Status   04/18/2024 1.0 0.5 - 1.4 mg/dL Final     Calcium   Date Value Ref Range Status   04/18/2024 9.0 8.7 - 10.5 mg/dL Final     Total Protein   Date Value Ref Range Status   04/18/2024 6.5 6.0 - 8.4 g/dL Final     Albumin   Date Value Ref Range Status   04/18/2024 3.9 3.5 - 5.2 g/dL Final     Total Bilirubin   Date Value Ref Range Status   04/18/2024 0.7 0.1 - 1.0 mg/dL Final     Comment:     For infants and newborns, interpretation of results should be based  on gestational age, weight and in agreement with " clinical  observations.    Premature Infant recommended reference ranges:  Up to 24 hours.............<8.0 mg/dL  Up to 48 hours............<12.0 mg/dL  3-5 days..................<15.0 mg/dL  6-29 days.................<15.0 mg/dL       Alkaline Phosphatase   Date Value Ref Range Status   04/18/2024 43 (L) 55 - 135 U/L Final     AST   Date Value Ref Range Status   04/18/2024 28 10 - 40 U/L Final     ALT   Date Value Ref Range Status   04/18/2024 23 10 - 44 U/L Final     Anion Gap   Date Value Ref Range Status   04/18/2024 7 (L) 8 - 16 mmol/L Final     eGFR if    Date Value Ref Range Status   01/26/2021 >60.0 >60 mL/min/1.73 m^2 Final     eGFR if non    Date Value Ref Range Status   01/26/2021 >60.0 >60 mL/min/1.73 m^2 Final     Comment:     Calculation used to obtain the estimated glomerular filtration  rate (eGFR) is the CKD-EPI equation.        Lab Results   Component Value Date    WBC 6.49 04/18/2024    HGB 15.5 04/18/2024    HCT 46.8 04/18/2024    MCV 94 04/18/2024     04/18/2024     Lab Results   Component Value Date    CHOL 234 (H) 11/03/2023    CHOL 223 (H) 12/14/2022    CHOL 210 (H) 01/26/2021     Lab Results   Component Value Date    HDL 73 11/03/2023    HDL 60 12/14/2022    HDL 38 (L) 01/26/2021     Lab Results   Component Value Date    LDLCALC 150.2 11/03/2023    LDLCALC 152.6 12/14/2022    LDLCALC 155.2 01/26/2021     Lab Results   Component Value Date    TRIG 54 11/03/2023    TRIG 52 12/14/2022    TRIG 84 01/26/2021     Lab Results   Component Value Date    CHOLHDL 31.2 11/03/2023    CHOLHDL 26.9 12/14/2022    CHOLHDL 18.1 (L) 01/26/2021     Lab Results   Component Value Date    TSH 1.287 08/12/2019       ASSESSMENT/PLAN     Matthew Pisano is a 69 y.o. male     Matthew was seen today for mass.    Diagnoses and all orders for this visit:    Neck nodule  -     US Soft Tissue Head Neck; Future     Patient was counseled on when and how to seek emergent care.       Chelsie  RYANN Farr PA-C   Department of Internal Medicine - [facility]   10:43 AM

## 2024-10-23 ENCOUNTER — TELEPHONE (OUTPATIENT)
Dept: NEUROLOGY | Facility: CLINIC | Age: 69
End: 2024-10-23
Payer: MEDICARE

## 2024-11-06 ENCOUNTER — OFFICE VISIT (OUTPATIENT)
Dept: NEUROLOGY | Facility: CLINIC | Age: 69
End: 2024-11-06
Payer: MEDICARE

## 2024-11-06 VITALS
SYSTOLIC BLOOD PRESSURE: 151 MMHG | HEART RATE: 64 BPM | DIASTOLIC BLOOD PRESSURE: 82 MMHG | BODY MASS INDEX: 24.64 KG/M2 | WEIGHT: 176 LBS | HEIGHT: 71 IN

## 2024-11-06 DIAGNOSIS — R20.2 PARESTHESIA: Primary | ICD-10-CM

## 2024-11-06 PROCEDURE — 99999 PR PBB SHADOW E&M-EST. PATIENT-LVL III: CPT | Mod: PBBFAC,HCNC,, | Performed by: STUDENT IN AN ORGANIZED HEALTH CARE EDUCATION/TRAINING PROGRAM

## 2024-11-06 PROCEDURE — 1160F RVW MEDS BY RX/DR IN RCRD: CPT | Mod: HCNC,CPTII,S$GLB, | Performed by: STUDENT IN AN ORGANIZED HEALTH CARE EDUCATION/TRAINING PROGRAM

## 2024-11-06 PROCEDURE — 3008F BODY MASS INDEX DOCD: CPT | Mod: HCNC,CPTII,S$GLB, | Performed by: STUDENT IN AN ORGANIZED HEALTH CARE EDUCATION/TRAINING PROGRAM

## 2024-11-06 PROCEDURE — 1126F AMNT PAIN NOTED NONE PRSNT: CPT | Mod: HCNC,CPTII,S$GLB, | Performed by: STUDENT IN AN ORGANIZED HEALTH CARE EDUCATION/TRAINING PROGRAM

## 2024-11-06 PROCEDURE — 3077F SYST BP >= 140 MM HG: CPT | Mod: HCNC,CPTII,S$GLB, | Performed by: STUDENT IN AN ORGANIZED HEALTH CARE EDUCATION/TRAINING PROGRAM

## 2024-11-06 PROCEDURE — 3079F DIAST BP 80-89 MM HG: CPT | Mod: HCNC,CPTII,S$GLB, | Performed by: STUDENT IN AN ORGANIZED HEALTH CARE EDUCATION/TRAINING PROGRAM

## 2024-11-06 PROCEDURE — 3288F FALL RISK ASSESSMENT DOCD: CPT | Mod: HCNC,CPTII,S$GLB, | Performed by: STUDENT IN AN ORGANIZED HEALTH CARE EDUCATION/TRAINING PROGRAM

## 2024-11-06 PROCEDURE — 1159F MED LIST DOCD IN RCRD: CPT | Mod: HCNC,CPTII,S$GLB, | Performed by: STUDENT IN AN ORGANIZED HEALTH CARE EDUCATION/TRAINING PROGRAM

## 2024-11-06 PROCEDURE — 1101F PT FALLS ASSESS-DOCD LE1/YR: CPT | Mod: HCNC,CPTII,S$GLB, | Performed by: STUDENT IN AN ORGANIZED HEALTH CARE EDUCATION/TRAINING PROGRAM

## 2024-11-06 PROCEDURE — 99203 OFFICE O/P NEW LOW 30 MIN: CPT | Mod: HCNC,S$GLB,, | Performed by: STUDENT IN AN ORGANIZED HEALTH CARE EDUCATION/TRAINING PROGRAM

## 2024-11-06 NOTE — PROGRESS NOTES
GENERAL NEUROLOGY VISIT   Date: 11/6/24  Patient Name: Matthew Pisano   MRN: [id number]   PCP: Leticia Andersen  Referring Provider: Self, Aaareferral    History:    Patient is a 69 y.o.  male no significant past medical history.  Here for neck and face paresthesia evaluation.       Reported 8 months of tingling sensation started in the R shoulder then spread to the face and neck on the right side described as rubbing feather sensation, reported shoulder improved but remains in  the neck and face, relieved by laying on the pillow on the right side. On and off, random, feels more intense with sitting or laying down,  when busy less conscious about it. Relieve as well by touching the face sometimes.  Denies pain, weakness, decreased sensation.   Did not triggered by brushing teeth or eating, recently had crown work on his teeth and did not trigger any pain.     Saw dermatology started on topical steroid cream, unsure if helping.   MRI cervical done in April shows cervical degenerative changes in multiple levels, saw orthopedic in August refer to back in spine Clinic and started on Lyrica 75 mg b.i.d., reported took once and stopped due to sleepiness side effects.     In general he is healthy, fit, works out daily, no new changes to his diet.     Reported he is on Viagra before this started he started to get supply online from [facility] and that is the only thing the he things changes from his routine.   Past Medical History:   Diagnosis Date    Hyperlipidemia        Past Surgical History:   Procedure Laterality Date    COLONOSCOPY N/A 4/9/2018    Procedure: COLONOSCOPY;  Surgeon: Tone Emmanuel MD;  Location: [address]);  Service: Endoscopy;  Laterality: N/A;    COLONOSCOPY N/A 3/14/2024    Procedure: COLONOSCOPY;  Surgeon: Margie Penn MD;  Location: [facility] ENDOSCOPY;  Service: Endoscopy;  Laterality: N/A;  Referral:  Leticia Andersen / SEAM[facility]  / Staci emailed to [email or web address] - LW  3/7- lvm and portal  msg for pc. DBM  3/12- pc complete. DBM       Social History     Socioeconomic History    Marital status: Single   Occupational History    Occupation: Restaurant     Employer: self   Tobacco Use    Smoking status: Never    Smokeless tobacco: Never   Substance and Sexual Activity    Alcohol use: Yes     Comment: Social    Drug use: No       Review of patient's allergies indicates:  No Known Allergies    Current Outpatient Medications on File Prior to Visit   Medication Sig Dispense Refill    atorvastatin (LIPITOR) 20 MG tablet Take 1 tablet (20 mg total) by mouth once daily. 90 tablet 1    b complex vitamins tablet 1 Tablet Oral Every day (Patient not taking: Reported on 10/9/2024)      multivitamin with minerals tablet Take 1 tablet by mouth once daily.      pregabalin (LYRICA) 75 MG capsule Take 1 capsule (75 mg total) by mouth 2 (two) times daily. (Patient not taking: Reported on 10/9/2024) 60 capsule 5    sildenafiL (VIAGRA) 50 MG tablet 1 Tablet Oral Every day prn      tadalafiL (CIALIS) 5 MG tablet Take by mouth. 1 Tablet Oral qd.  Dispense 1 box of Cialis for daily use.      testosterone cypionate (DEPOTESTOTERONE CYPIONATE) 200 mg/mL injection Inject 200 mg into the muscle.      triamcinolone acetonide 0.1% (KENALOG) 0.1 % cream Apply topically 2 (two) times daily. Use to affected areas for up to 2 weeks then take a 1 week break or decrease to 3 times weekly. Do not apply to groin or face (Patient not taking: Reported on 10/9/2024) 454 g 1     No current facility-administered medications on file prior to visit.        Family history:  Family History   Problem Relation Name Age of Onset    Bladder Cancer Father      Pancreatic cancer Brother      Melanoma Neg Hx      Psoriasis Neg Hx      Lupus Neg Hx         Review Of Systems     Constitutional Negative for fevers, chills, weigh loss   HEENT Negative for hearing loss, dysphagia, sore throat, diplopia   Respiratory Negative for shortness of breath, cough   "  Cardiovascular Negative for chest pain, palpitations    Gastrointestinal Negative for constipation, diarrhea, early satiety    Skin Negative for rashes    Musculoskeletal Negative for joint pains, myalgias.   Neurological See Above    Psychological Negative for sleep disturbances.    Heme/Lymph Negative for easy bruising, easy bleeding    Endocrine Negative for polyuria, polydypsia     Physical Exam:     Physical Examination    Vitals: BP (!) 151/82 (BP Location: Left arm, Patient Position: Sitting)   Pulse 64   Ht 5' 11" (1.803 m)   Wt 79.8 kg (176 lb)   BMI 24.55 kg/m²       NEURO    Neurological Exam  Mental Status:  Alert and oriented to person, place and time, recent and remote memory intact, normal attention span and fund of knowledge; Speech is spontaneous and fluent     Cranial Nerve exam:  II: Visual fields full to confrontation; Pupils equal round and reactive about 3mm  III, IV, VI: Extraoccular movements intact with no nystagmus  V: Sensation in V1, V2, V3 intact to light-touch bilaterally,  VII:  No facial weakness,  VIII: Hearing grossly intact  IX,X: palate elevation symmetric   XI: SCM & Trapezius normal,  XII: tongue midline, normal morphology, tongue movement normal     Motor Exam: No involuntary movement. Normal tone and bulk in all 4 extremities  Strength: 5/5 throughout   Reflexes: 2+ throughout    Sensory Exam:  Intact sensation in face and neck shoulder bilaterally  Cerebellar Sign: Normal Finger-to-nose,  bilaterally.  Gait:  Normal steady physiologic gait with normal arm swinging on both side     Interval/Previous Work-up:   Reviewed  Results for orders placed during the hospital encounter of 05/13/15    MRI cervical 06/12/2024  FINDINGS:  Comparison is 05/13/2015.     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  No fracture dislocation bone destruction seen.  Odontoid, prevertebral soft tissues, and posterior elements are intact.  No soft tissue injury or whiplash " injury is seen.  The craniocervical junction is unremarkable.     C2-C3 demonstrates very mild DJD.  The canal and neural foramina are patent.     C3-C4 demonstrates disc osteophyte complex that flattens anterior thecal sac.  There is mild canal narrowing and bilateral moderate neural foraminal narrowing.     C4-C5 demonstrates disc osteophyte complex that flattens the anterior thecal sac.  There is a right paracentral disc protrusion that abuts the right anterior cord.  The cord is preserved in signal and caliber.  There is mild canal narrowing and there is moderate bilateral neural foraminal narrowing.     C5-C6 demonstrates disc osteophyte complex that flattens the anterior thecal sac.  There is mild canal narrowing.  There is moderate left, mild right neural foraminal narrowing.  There is a focal area of myelomalacia in the right paracentral anterior cord.     C6-C7 demonstrates disc osteophyte complex that lateralizes towards the left and flattens the anterior/left anterolateral thecal sac.  There is moderate bilateral neural foraminal narrowing.     C7-T1 demonstrates mild DJD.  The canal is patent.  There is mild neural foraminal narrowing.     Impression:     Degenerative changes as above relatively unchanged from the prior study.        MRI Brain W WO Contrast    Narrative  Procedure: MRI the brain with and without contrast.    Technique: Sagittal and axial T1, axial T2, axial FLAIR, axial gradient, axial diffusion, and axial, sagittal, and coronal postcontrast T1 images of the whole brain. 9 ml of Gadavist injected intravenously.    Comparison: None    Findings: Age-appropriate generalized cerebral volume loss. There are several scattered punctate foci of  T2/FLAIR signal hyperintensity in the supratentorial white matter without corresponding diffusion signal abnormality or enhancement. These are  nonspecific and suggestive for mild  degree of chronic microvascular ischemic change. There is no restricted  diffusion to suggest acute infarction. No abnormal parenchymal enhancement. Ventricles normal in size and configuration without hydrocephalus. No  midline shift or mass effect. No abnormal parenchymal gradient susceptibility. The major intracranial T2 flow-voids are present.  IMPRESSION:  Age-appropriate  generalized volume loss with scattered punctate foci of T2/FLAIR signal abnormality within the supratentorial white matter  while nonspecific suggestive for mild degree of  chronic microvascular ischemic change.    No evidence for acute infarction or enhancing lesion.      Electronically signed by: VALERIE PATTERSON DO  Date:     05/13/15  Time:    14:11        Assessment and Plan:   Matthew Pisano is a 69 y.o. male with no past medical history president with intermittent tingling/paresthesia sensation in the right shoulder neck and face for the past 8 months, on and off, relieved by touching the face and sleep on the pill on the right side, no change in severity since then, no trigeminal neuralgia symptoms, no weakness, MRI cervical with multiple degenerative changes possible spinal call cord stenosis.     Problem List Items Addressed This Visit    None  Visit Diagnoses       Paresthesia              -continue Lyrica 75 mg nightly  -EMG nerve conduction study, opinion if he would benefit from the test  -follow up with the spine clinic        RTC 3-4 months  Time spent on this encounter:  Verify minutes. This includes face to face time and non-face to face time preparing to see the patient (eg, review of tests), obtaining and/or reviewing separately obtained history, documenting clinical information in the electronic or other health record, independently interpreting results and communicating results to the patient/family/caregiver, or care coordinator.       A dictation device was used to produce this document. Use of such devices sometimes results in grammatical errors or replacement of words that sound similarly.      Francisco Caballero MD, M.B.Ch.B  Neurology, Vascular neurology  [facility]

## 2024-11-08 ENCOUNTER — TELEPHONE (OUTPATIENT)
Dept: INTERNAL MEDICINE | Facility: CLINIC | Age: 69
End: 2024-11-08

## 2024-11-08 ENCOUNTER — TELEPHONE (OUTPATIENT)
Dept: INTERNAL MEDICINE | Facility: CLINIC | Age: 69
End: 2024-11-08
Payer: MEDICARE

## 2024-11-08 NOTE — TELEPHONE ENCOUNTER
Called patient his BP- 122/62. Did another one on 168/111, he is thinking that one was not accurate, he was having a headache and pressure in his eyes 5-6/10 He took 800mg Ibuprofen. The provider I saw stated to just keep monitoring it. Sporadically its 140-150/85 Mom had High blood pressure. Over left eye was where the pain was, yesterday he had a headache, his wrist bp 137/89 at 4:43. He takes pre workout ZIpfiz it has caffeine he found them at [facility] it has vitamins and green tea. He is eating healthy and exercising. Can you see him next week Dr Schmitt? He can see you any day of the week, any time. Thank you. He threw his back out 2 weeks ago incidentally and is taking Ibuprofen for it. Advisory no exercise no zipfiz, no salty foods and your office will call an offer a soonest appt to come in to see you. Thank you.

## 2024-11-08 NOTE — TELEPHONE ENCOUNTER
----- Message from Ray sent at 11/8/2024 10:56 AM CST -----  Regarding: Pt Blood Pressure Report  Contact: Pt [phone]  .1MEDICALADVICE     Patient is calling for Medical Advice regarding: Patient called to have someone call him back regarding his blood pressure. He was instructed to monitor his BP and call the office to report his results. Please call back to discuss with patient.   Last Reading was high according to patient.     How long has patient had these symptoms:    Pharmacy name and phone#:    Patient wants a call back or thru [facility]: Call    Comments:    Please advise patient replies from provider may take up to 48 hours.

## 2024-11-11 NOTE — TELEPHONE ENCOUNTER
I scheduled him for 11-12-24 at 10 AM. Please inform him and let me know if he cannot make the appt

## 2024-11-12 ENCOUNTER — OFFICE VISIT (OUTPATIENT)
Dept: INTERNAL MEDICINE | Facility: CLINIC | Age: 69
End: 2024-11-12
Payer: MEDICARE

## 2024-11-12 DIAGNOSIS — I70.0 AORTIC ATHEROSCLEROSIS: ICD-10-CM

## 2024-11-12 DIAGNOSIS — Z00.00 PREVENTATIVE HEALTH CARE: ICD-10-CM

## 2024-11-12 DIAGNOSIS — Z12.5 ENCOUNTER FOR SCREENING FOR MALIGNANT NEOPLASM OF PROSTATE: ICD-10-CM

## 2024-11-12 DIAGNOSIS — I10 HYPERTENSION, UNSPECIFIED TYPE: Primary | ICD-10-CM

## 2024-11-12 PROCEDURE — 99999 PR PBB SHADOW E&M-EST. PATIENT-LVL III: CPT | Mod: PBBFAC,HCNC,, | Performed by: INTERNAL MEDICINE

## 2024-11-12 RX ORDER — LOSARTAN POTASSIUM 25 MG/1
25 TABLET ORAL DAILY
Qty: 90 TABLET | Refills: 1 | Status: SHIPPED | OUTPATIENT
Start: 2024-11-12 | End: 2025-11-12

## 2024-11-13 ENCOUNTER — LAB VISIT (OUTPATIENT)
Dept: LAB | Facility: HOSPITAL | Age: 69
End: 2024-11-13
Attending: INTERNAL MEDICINE
Payer: MEDICARE

## 2024-11-13 DIAGNOSIS — Z12.5 ENCOUNTER FOR SCREENING FOR MALIGNANT NEOPLASM OF PROSTATE: ICD-10-CM

## 2024-11-13 DIAGNOSIS — I10 HYPERTENSION, UNSPECIFIED TYPE: ICD-10-CM

## 2024-11-13 LAB
ALBUMIN SERPL BCP-MCNC: 3.9 G/DL (ref 3.5–5.2)
ALP SERPL-CCNC: 45 U/L (ref 40–150)
ALT SERPL W/O P-5'-P-CCNC: 19 U/L (ref 10–44)
ANION GAP SERPL CALC-SCNC: 7 MMOL/L (ref 8–16)
AST SERPL-CCNC: 19 U/L (ref 10–40)
BILIRUB SERPL-MCNC: 0.8 MG/DL (ref 0.1–1)
BUN SERPL-MCNC: 23 MG/DL (ref 8–23)
CALCIUM SERPL-MCNC: 8.9 MG/DL (ref 8.7–10.5)
CHLORIDE SERPL-SCNC: 106 MMOL/L (ref 95–110)
CHOLEST SERPL-MCNC: 218 MG/DL (ref 120–199)
CHOLEST/HDLC SERPL: 3.2 {RATIO} (ref 2–5)
CO2 SERPL-SCNC: 27 MMOL/L (ref 23–29)
COMPLEXED PSA SERPL-MCNC: 3.3 NG/ML (ref 0–4)
CREAT SERPL-MCNC: 0.9 MG/DL (ref 0.5–1.4)
EST. GFR  (NO RACE VARIABLE): >60 ML/MIN/1.73 M^2
GLUCOSE SERPL-MCNC: 100 MG/DL (ref 70–110)
HDLC SERPL-MCNC: 68 MG/DL (ref 40–75)
HDLC SERPL: 31.2 % (ref 20–50)
LDLC SERPL CALC-MCNC: 138.2 MG/DL (ref 63–159)
NONHDLC SERPL-MCNC: 150 MG/DL
POTASSIUM SERPL-SCNC: 4.3 MMOL/L (ref 3.5–5.1)
PROT SERPL-MCNC: 6.8 G/DL (ref 6–8.4)
SODIUM SERPL-SCNC: 140 MMOL/L (ref 136–145)
TRIGL SERPL-MCNC: 59 MG/DL (ref 30–150)

## 2024-11-13 PROCEDURE — 36415 COLL VENOUS BLD VENIPUNCTURE: CPT | Mod: HCNC | Performed by: INTERNAL MEDICINE

## 2024-11-13 PROCEDURE — 80053 COMPREHEN METABOLIC PANEL: CPT | Mod: HCNC | Performed by: INTERNAL MEDICINE

## 2024-11-13 PROCEDURE — 80061 LIPID PANEL: CPT | Mod: HCNC | Performed by: INTERNAL MEDICINE

## 2024-11-13 PROCEDURE — 84153 ASSAY OF PSA TOTAL: CPT | Mod: HCNC | Performed by: INTERNAL MEDICINE

## 2024-11-15 VITALS
BODY MASS INDEX: 24.81 KG/M2 | HEART RATE: 72 BPM | OXYGEN SATURATION: 97 % | SYSTOLIC BLOOD PRESSURE: 138 MMHG | WEIGHT: 177.94 LBS | TEMPERATURE: 99 F | DIASTOLIC BLOOD PRESSURE: 88 MMHG

## 2024-11-15 PROBLEM — I70.0 AORTIC ATHEROSCLEROSIS: Status: ACTIVE | Noted: 2024-11-15

## 2024-11-15 NOTE — PROGRESS NOTES
Subjective:       Patient ID: Matthew Pisano is a 69 y.o. male.    Chief Complaint: Annual Exam    HPI  He is here for annual exam.  He has been checking his blood pressure, and it has been elevated on multiple occasions     Past medical history:  Hyperlipidemia, cervical spinal stenosis, family history of colon cancer, colon adenoma.  He had a colonoscopy March 2024     Medications:  Lipitor 20 mg daily     No known drug allergies        Review of Systems   Constitutional:  Negative for chills, fatigue, fever and unexpected weight change.   Respiratory:  Negative for chest tightness and shortness of breath.    Cardiovascular:  Negative for chest pain and palpitations.   Gastrointestinal:  Negative for abdominal pain and blood in stool.   Neurological:  Negative for dizziness, syncope, numbness and headaches.       Objective:      Physical Exam  HENT:      Right Ear: External ear normal.      Left Ear: External ear normal.      Nose: Nose normal.      Mouth/Throat:      Mouth: Mucous membranes are moist.      Pharynx: Oropharynx is clear.   Eyes:      Pupils: Pupils are equal, round, and reactive to light.   Cardiovascular:      Rate and Rhythm: Normal rate and regular rhythm.      Heart sounds: No murmur heard.  Pulmonary:      Breath sounds: Normal breath sounds.   Abdominal:      General: There is no distension.      Palpations: There is no hepatomegaly.      Tenderness: There is no abdominal tenderness.   Musculoskeletal:      Cervical back: Normal range of motion.   Lymphadenopathy:      Cervical: No cervical adenopathy.      Upper Body:      Right upper body: No axillary adenopathy.      Left upper body: No axillary adenopathy.   Neurological:      Cranial Nerves: No cranial nerve deficit.      Sensory: No sensory deficit.      Motor: Motor function is intact.      Deep Tendon Reflexes: Reflexes are normal and symmetric.         Assessment/Plan       Annual exam: Check CMP, lipid panel, PSA   Hypertension:  Start Cozaar 25 mg daily.  Return to clinic in 1 month

## 2024-11-29 RX ORDER — ATORVASTATIN CALCIUM 20 MG/1
20 TABLET, FILM COATED ORAL DAILY
Qty: 90 TABLET | Refills: 3 | Status: SHIPPED | OUTPATIENT
Start: 2024-11-29 | End: 2025-11-24

## 2024-11-29 NOTE — TELEPHONE ENCOUNTER
No care due was identified.  [facility] Embedded Care Due Messages. Reference number: [id number].   11/29/2024 2:06:07 PM CST

## 2024-11-30 NOTE — TELEPHONE ENCOUNTER
Refill Decision Note   Matthew Pisano  is requesting a refill authorization.  Brief Assessment and Rationale for Refill:  Approve     Medication Therapy Plan:        Comments:     Note composed:8:41 PM 11/29/2024

## 2024-12-02 ENCOUNTER — TELEPHONE (OUTPATIENT)
Dept: INTERNAL MEDICINE | Facility: CLINIC | Age: 69
End: 2024-12-02
Payer: MEDICARE

## 2024-12-02 NOTE — TELEPHONE ENCOUNTER
----- Message from Elizabeth sent at 12/2/2024 11:42 AM CST -----  Contact: [phone]  .1MEDICALADVICE     Patient is calling for Medical Advice regarding: Patient is calling to notify his cholesterol medicine from atorvastatin to Zetia.     How long has patient had these symptoms: n/a    Pharmacy name and phone#:      [facility] [place] - [address]  [address] [place] [address]  Phone: [phone] Fax: [phone]         Patient wants a call back or thru [facility]: call back     Comments:    Please advise patient replies from provider may take up to 48 hours.

## 2024-12-02 NOTE — TELEPHONE ENCOUNTER
I do not understand- why does he want to change his medication?   Zetia is not as strong and will likely not get his cholesterol down to where it needs to be

## 2024-12-09 ENCOUNTER — TELEPHONE (OUTPATIENT)
Dept: INTERNAL MEDICINE | Facility: CLINIC | Age: 69
End: 2024-12-09
Payer: MEDICARE

## 2024-12-09 NOTE — TELEPHONE ENCOUNTER
----- Message from Elizabeth sent at 12/9/2024 12:57 PM CST -----  Contact: [phone]  .1MEDICALADVICE     Patient is calling for Medical Advice regarding: Patient is calling regarding  verify status about his  request to change his medication to Zetia.    How long has patient had these symptoms: n/a    Pharmacy name and phone#:      [facility] [place] - [address]  [address] [place] [address]  Phone: [phone] Fax: [phone]      Patient wants a call back or thru [facility]: call back     Comments: Thank you     Please advise patient replies from provider may take up to 48 hours.

## 2024-12-10 ENCOUNTER — TELEPHONE (OUTPATIENT)
Dept: INTERNAL MEDICINE | Facility: CLINIC | Age: 69
End: 2024-12-10
Payer: MEDICARE

## 2024-12-10 ENCOUNTER — PATIENT MESSAGE (OUTPATIENT)
Dept: INTERNAL MEDICINE | Facility: CLINIC | Age: 69
End: 2024-12-10

## 2024-12-10 DIAGNOSIS — E78.5 HYPERLIPIDEMIA, UNSPECIFIED HYPERLIPIDEMIA TYPE: Primary | ICD-10-CM

## 2024-12-10 RX ORDER — EZETIMIBE 10 MG/1
10 TABLET ORAL DAILY
Qty: 90 TABLET | Refills: 1 | Status: SHIPPED | OUTPATIENT
Start: 2024-12-10 | End: 2025-12-10

## 2024-12-10 NOTE — TELEPHONE ENCOUNTER
Called and spoke to patient . Patient wants the change due to  the muscle aches in knees and back area. Patient had started taking a half one day and a whole one  the next day. Has not taking them for a week. Tried with co-q10 and did not help as well. Patient does not want to quit completely but is in agreement to try something else. Discussed the risks and benefits and patient verbalized understanding with read back.

## 2024-12-10 NOTE — TELEPHONE ENCOUNTER
OK, we can try zetia. I sent that prescription.    I would like to recheck his level in 3 months. Lab orders in, please schedule in 3 months

## 2024-12-24 RX ORDER — ATORVASTATIN CALCIUM 10 MG/1
1 TABLET, FILM COATED ORAL DAILY
COMMUNITY

## 2025-03-24 DIAGNOSIS — Z00.00 ENCOUNTER FOR MEDICARE ANNUAL WELLNESS EXAM: ICD-10-CM

## 2025-04-25 ENCOUNTER — OFFICE VISIT (OUTPATIENT)
Dept: INTERNAL MEDICINE | Facility: CLINIC | Age: 70
End: 2025-04-25
Payer: MEDICARE

## 2025-04-25 VITALS
WEIGHT: 177.69 LBS | SYSTOLIC BLOOD PRESSURE: 128 MMHG | OXYGEN SATURATION: 97 % | BODY MASS INDEX: 24.88 KG/M2 | DIASTOLIC BLOOD PRESSURE: 82 MMHG | HEIGHT: 71 IN | HEART RATE: 71 BPM

## 2025-04-25 DIAGNOSIS — R42 VERTIGO: Primary | ICD-10-CM

## 2025-04-25 DIAGNOSIS — R42 DIZZINESS: ICD-10-CM

## 2025-04-25 PROCEDURE — 3074F SYST BP LT 130 MM HG: CPT | Mod: CPTII,S$GLB,,

## 2025-04-25 PROCEDURE — 1126F AMNT PAIN NOTED NONE PRSNT: CPT | Mod: CPTII,S$GLB,,

## 2025-04-25 PROCEDURE — 3288F FALL RISK ASSESSMENT DOCD: CPT | Mod: CPTII,S$GLB,,

## 2025-04-25 PROCEDURE — 1159F MED LIST DOCD IN RCRD: CPT | Mod: CPTII,S$GLB,,

## 2025-04-25 PROCEDURE — 3079F DIAST BP 80-89 MM HG: CPT | Mod: CPTII,S$GLB,,

## 2025-04-25 PROCEDURE — 1101F PT FALLS ASSESS-DOCD LE1/YR: CPT | Mod: CPTII,S$GLB,,

## 2025-04-25 PROCEDURE — 99999 PR PBB SHADOW E&M-EST. PATIENT-LVL IV: CPT | Mod: PBBFAC,,,

## 2025-04-25 PROCEDURE — 3008F BODY MASS INDEX DOCD: CPT | Mod: CPTII,S$GLB,,

## 2025-04-25 PROCEDURE — 99213 OFFICE O/P EST LOW 20 MIN: CPT | Mod: S$GLB,,,

## 2025-04-25 NOTE — PROGRESS NOTES
INTERNAL MEDICINE URGENT CARE NOTE    CHIEF COMPLAINT     Matthew presents today for evaluation of dizziness.    HPI     Matthew Pisano is a 69 y.o. male who presents for an urgent care visit today.    HISTORY OF PRESENT ILLNESS:  He reports experiencing dizziness for over 2 years, having undergone extensive evaluations with audiologists and a neurologist, including 2 weeks of daily testing. Episodes are triggered by chest exercises, shoulder exercises while lying down, and neck movements, typically occurring the day after physical activity. Episodes can be severe enough to cause bed confinement for up to 2 days. Current management includes avoiding lying flat, limiting certain exercises, and being cautious with quick head movements. During episodes, he needs to stand up and hold onto something for stability. Reports having seen MD who believes he may have subclavian steel syndrome and to have PCP order subclavian MRA    ASSOCIATED SYMPTOMS:  He experiences blurry vision and compromised vision in the right eye during episodes of straining. His right eye becomes watery with sensation of foreign body presence. He also reports chronic itching affecting the jaw, face, and neck for several years, which initially began in the upper arm as a feather-like tingling and progressed to primarily affect the jawline and side of face. Symptoms completely resolve when lying on right side.    PREVIOUS TREATMENTS:  Prior medications include Meloxicam, Meclizine, and Gabapentin, all reported as ineffective with adverse effects. A topical steroid cream prescribed by dermatologist provided temporary relief.    IMAGING:  Head X-rays were normal and cervical spine MRI was completed within past 6 months at [facility].    ROS:  General: -fever, -chills, -fatigue, -weight gain, -weight loss  Eyes: -vision changes, -redness, -discharge, +blurry vision, +eye watering  ENT: -ear pain, -nasal congestion, -sore throat  Cardiovascular: -chest pain,  -palpitations, -lower extremity edema  Respiratory: -cough, -shortness of breath  Gastrointestinal: -abdominal pain, -nausea, -vomiting, -diarrhea, -constipation, -blood in stool  Genitourinary: -dysuria, -hematuria, -frequency  Musculoskeletal: -joint pain, -muscle pain  Skin: -rash, -lesion, +itching  Neurological: -headache, +dizziness, +numbness, +tingling, +vertigo  Psychiatric: -anxiety, -depression, -sleep difficulty        Past Medical History:  Past Medical History:   Diagnosis Date    Hyperlipidemia      Home Medications:  Prior to Admission medications    Medication Sig Start Date End Date Taking? Authorizing Provider   b complex vitamins tablet 1 Tablet Oral Every day   Yes Provider, Historical   ezetimibe (ZETIA) 10 mg tablet Take 1 tablet (10 mg total) by mouth once daily. 12/10/24 12/10/25 Yes Leticia Andersen MD   losartan (COZAAR) 25 MG tablet Take 1 tablet (25 mg total) by mouth once daily. 11/12/24 11/12/25 Yes Leticia Andersen MD   sildenafiL (VIAGRA) 50 MG tablet 1 Tablet Oral Every day prn   Yes Provider, Historical   tadalafiL (CIALIS) 5 MG tablet Take by mouth. 1 Tablet Oral qd.  Dispense 1 box of Cialis for daily use.   Yes Provider, Historical   testosterone cypionate (DEPOTESTOTERONE CYPIONATE) 200 mg/mL injection Inject 200 mg into the muscle. 5/22/24 4/25/25 Yes Provider, Historical   atorvastatin (LIPITOR) 10 MG tablet Take 1 tablet by mouth once daily.  Patient not taking: Reported on 4/25/2025    Provider, Historical   multivitamin with minerals tablet Take 1 tablet by mouth once daily.  Patient not taking: Reported on 11/12/2024    Provider, Historical   pregabalin (LYRICA) 75 MG capsule Take 1 capsule (75 mg total) by mouth 2 (two) times daily.  Patient not taking: Reported on 4/25/2025 8/6/24 2/4/25  Rosario Gupta PA-C   triamcinolone acetonide 0.1% (KENALOG) 0.1 % cream Apply topically 2 (two) times daily. Use to affected areas for up to 2 weeks then take a 1 week  "break or decrease to 3 times weekly. Do not apply to groin or face  Patient not taking: Reported on 10/9/2024 9/8/23   Kuldip Winslow MD     PHYSICAL EXAM     General: No acute distress. Well-developed. Well-nourished.  Eyes: EOMI. Sclerae anicteric.  HENT: Normocephalic. Atraumatic.   Cardiovascular: Regular rate. Regular rhythm. No murmurs. No rubs. No gallops. Normal S1, S2.  Respiratory: Normal respiratory effort. Clear to auscultation bilaterally. No rales. No rhonchi. No wheezing.  Abdomen: Soft. Non-tender. Non-distended. Normoactive bowel sounds.  Musculoskeletal: No  obvious deformity.  Extremities: No lower extremity edema.  Neurological: Alert & oriented x3. No slurred speech. Normal gait.  Psychiatric: Normal mood. Normal affect. Good insight. Good judgment.  Skin: Warm. Dry. No rash.        /82 (BP Location: Left arm, Patient Position: Sitting)   Pulse 71   Ht 5' 11" (1.803 m)   Wt 80.6 kg (177 lb 11.1 oz)   SpO2 97%   BMI 24.78 kg/m²     Physical Exam    ASSESSMENT/PLAN     1. Vertigo  -     US Carotid Bilateral; Future; Expected date: 04/25/2025    2. 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  - Noted history of extensive workup including audiologist evaluations, neurologist consult, and imaging studies (MRIs, XRs) that were reportedly normal.  - Recognized need for further evaluation of subclavian artery, potentially through MRA imaging.  - Acknowledged complexity of case, given multiple symptoms including facial itching, neck discomfort, and intermittent vision changes.  - Will review previous imaging studies to determine if subclavian artery was adequately visualized.  - Will determine appropriate next steps for further evaluation of suspected subclavian steal syndrome, potentially " including referral to vascular surgery or ordering an MRA.    DIZZINESS/VIRTIGO  - Monitored patient's persistent dizziness of 2+ years duration, which can be severe enough to confine patient to bed for 2 days.  - Matthew has undergone extensive testing including daily evaluations for 2 weeks with audiologists, neurologists, and otolaryngologists.  - Performed [place]-Hallpike maneuver daily with inconsistent results.  - Dizziness pattern often occurs a day after chest or shoulder exercises and can be triggered by quick head movements.  - Optometrist suspects subclavian steal syndrome as possible etiology, involving arterial blockage affecting balance and vestibular function, and recommends MRA of the subclavian artery for further evaluation.  - Prescribed Meclizine for symptom management, though patient reports it exacerbates symptoms.  - Advised lifestyle modifications including avoiding supine positioning and certain exercises.  - BP obtained in right and left arms. Marginal (<10mmHG difference), which is not consistent with subclavian steel syndrome.    I spent a total of 23 minutes on the day of the visit.This includes face to face time and non-face to face time preparing to see the patient (eg, review of tests), obtaining and/or reviewing separately obtained history, documenting clinical information in the electronic or other health record, independently interpreting results and communicating results to the patient/family/caregiver, or care coordinator.       Patient education provided from James. Patient was counseled on when and how to seek emergent care.   This note was generated with the assistance of ambient listening technology. Verbal consent was obtained by the patient and accompanying visitor(s) for the recording of patient appointment to facilitate this note. I attest to having reviewed and edited the generated note for accuracy, though some syntax or spelling errors may persist. Please contact the  author of this note for any clarification.       Vashti AKERS, APRN, FNP-c   Department of Internal Medicine - [facility]  1:40 PM

## 2025-04-29 ENCOUNTER — TELEPHONE (OUTPATIENT)
Dept: INTERNAL MEDICINE | Facility: CLINIC | Age: 70
End: 2025-04-29
Payer: MEDICARE

## 2025-04-29 NOTE — TELEPHONE ENCOUNTER
----- Message from Zenaida sent at 4/29/2025  1:05 PM CDT -----  Contact: [phone]  [email or web address] Patient is calling for Medical Advice regarding:referral for a MRA How long has patient had these symptoms:Pharmacy name and phone#:Patient wants a call back or thru [facility]:call back Comments:States he saw a NP but the wrong thing was ordered he is asking for a return call please advise Please advise patient replies from provider may take up to 48 hours.

## 2025-04-30 NOTE — TELEPHONE ENCOUNTER
Called and spoke to patient. Carotid US was ordered intentionally for symptoms described,  as the next step. This was conveyed to Matthew, who understands.

## 2025-06-06 ENCOUNTER — LAB VISIT (OUTPATIENT)
Dept: LAB | Facility: HOSPITAL | Age: 70
End: 2025-06-06
Attending: INTERNAL MEDICINE
Payer: MEDICARE

## 2025-06-06 ENCOUNTER — OFFICE VISIT (OUTPATIENT)
Dept: INTERNAL MEDICINE | Facility: CLINIC | Age: 70
End: 2025-06-06
Payer: MEDICARE

## 2025-06-06 DIAGNOSIS — I73.9 PVD (PERIPHERAL VASCULAR DISEASE): ICD-10-CM

## 2025-06-06 DIAGNOSIS — I10 HYPERTENSION, UNSPECIFIED TYPE: ICD-10-CM

## 2025-06-06 DIAGNOSIS — R09.89 OTHER SPECIFIED SYMPTOMS AND SIGNS INVOLVING THE CIRCULATORY AND RESPIRATORY SYSTEMS: ICD-10-CM

## 2025-06-06 DIAGNOSIS — R20.2 PARESTHESIA: ICD-10-CM

## 2025-06-06 DIAGNOSIS — R20.2 PARESTHESIA: Primary | ICD-10-CM

## 2025-06-06 LAB
ABSOLUTE EOSINOPHIL (OHS): 0.12 K/UL
ABSOLUTE MONOCYTE (OHS): 0.59 K/UL (ref 0.3–1)
ABSOLUTE NEUTROPHIL COUNT (OHS): 3.51 K/UL (ref 1.8–7.7)
BASOPHILS # BLD AUTO: 0.09 K/UL
BASOPHILS NFR BLD AUTO: 1.5 %
ERYTHROCYTE [DISTWIDTH] IN BLOOD BY AUTOMATED COUNT: 12.2 % (ref 11.5–14.5)
HCT VFR BLD AUTO: 49.1 % (ref 40–54)
HGB BLD-MCNC: 16.3 GM/DL (ref 14–18)
IMM GRANULOCYTES # BLD AUTO: 0.03 K/UL (ref 0–0.04)
IMM GRANULOCYTES NFR BLD AUTO: 0.5 % (ref 0–0.5)
LYMPHOCYTES # BLD AUTO: 1.72 K/UL (ref 1–4.8)
MCH RBC QN AUTO: 30.8 PG (ref 27–31)
MCHC RBC AUTO-ENTMCNC: 33.2 G/DL (ref 32–36)
MCV RBC AUTO: 93 FL (ref 82–98)
NUCLEATED RBC (/100WBC) (OHS): 0 /100 WBC
PLATELET # BLD AUTO: 260 K/UL (ref 150–450)
PMV BLD AUTO: 9.7 FL (ref 9.2–12.9)
RBC # BLD AUTO: 5.29 M/UL (ref 4.6–6.2)
RELATIVE EOSINOPHIL (OHS): 2 %
RELATIVE LYMPHOCYTE (OHS): 28.4 % (ref 18–48)
RELATIVE MONOCYTE (OHS): 9.7 % (ref 4–15)
RELATIVE NEUTROPHIL (OHS): 57.9 % (ref 38–73)
WBC # BLD AUTO: 6.06 K/UL (ref 3.9–12.7)

## 2025-06-06 PROCEDURE — 36415 COLL VENOUS BLD VENIPUNCTURE: CPT

## 2025-06-06 PROCEDURE — 99214 OFFICE O/P EST MOD 30 MIN: CPT | Mod: S$GLB,,, | Performed by: INTERNAL MEDICINE

## 2025-06-06 PROCEDURE — 85025 COMPLETE CBC W/AUTO DIFF WBC: CPT

## 2025-06-06 PROCEDURE — 3008F BODY MASS INDEX DOCD: CPT | Mod: CPTII,S$GLB,, | Performed by: INTERNAL MEDICINE

## 2025-06-06 PROCEDURE — 3079F DIAST BP 80-89 MM HG: CPT | Mod: CPTII,S$GLB,, | Performed by: INTERNAL MEDICINE

## 2025-06-06 PROCEDURE — 1101F PT FALLS ASSESS-DOCD LE1/YR: CPT | Mod: CPTII,S$GLB,, | Performed by: INTERNAL MEDICINE

## 2025-06-06 PROCEDURE — 1126F AMNT PAIN NOTED NONE PRSNT: CPT | Mod: CPTII,S$GLB,, | Performed by: INTERNAL MEDICINE

## 2025-06-06 PROCEDURE — G2211 COMPLEX E/M VISIT ADD ON: HCPCS | Mod: S$GLB,,, | Performed by: INTERNAL MEDICINE

## 2025-06-06 PROCEDURE — 3288F FALL RISK ASSESSMENT DOCD: CPT | Mod: CPTII,S$GLB,, | Performed by: INTERNAL MEDICINE

## 2025-06-06 PROCEDURE — 3075F SYST BP GE 130 - 139MM HG: CPT | Mod: CPTII,S$GLB,, | Performed by: INTERNAL MEDICINE

## 2025-06-06 PROCEDURE — 99999 PR PBB SHADOW E&M-EST. PATIENT-LVL IV: CPT | Mod: PBBFAC,,, | Performed by: INTERNAL MEDICINE

## 2025-06-07 ENCOUNTER — RESULTS FOLLOW-UP (OUTPATIENT)
Dept: INTERNAL MEDICINE | Facility: CLINIC | Age: 70
End: 2025-06-07

## 2025-06-08 VITALS
TEMPERATURE: 99 F | HEART RATE: 69 BPM | DIASTOLIC BLOOD PRESSURE: 80 MMHG | BODY MASS INDEX: 25.16 KG/M2 | WEIGHT: 179.69 LBS | SYSTOLIC BLOOD PRESSURE: 138 MMHG | HEIGHT: 71 IN | OXYGEN SATURATION: 96 %

## 2025-06-08 NOTE — PROGRESS NOTES
Subjective:       Patient ID: Matthew Pisano is a 69 y.o. male.    Chief Complaint: Hypertension    HPI  He returns for management of hypertension.  He has had hypertension for over a year.  Current treatment has included medications outlined in medication list.  He denies chest pain or shortness of breath.  No palpitations.  Denies left arm or neck pain.  He complains of dizziness and occasional tingling on his face    Medications: See medication list     Social history: Does not smoke, does not drink alcohol       Review of Systems   Constitutional:  Negative for chills, fatigue, fever and unexpected weight change.   Respiratory:  Negative for chest tightness and shortness of breath.    Cardiovascular:  Negative for chest pain and palpitations.   Gastrointestinal:  Negative for abdominal pain and blood in stool.   Neurological:  Negative for dizziness, syncope, numbness and headaches.       Objective:      Physical Exam  HENT:      Right Ear: External ear normal.      Left Ear: External ear normal.      Nose: Nose normal.      Mouth/Throat:      Mouth: Mucous membranes are moist.      Pharynx: Oropharynx is clear.   Eyes:      Pupils: Pupils are equal, round, and reactive to light.   Cardiovascular:      Rate and Rhythm: Normal rate and regular rhythm.      Heart sounds: No murmur heard.  Pulmonary:      Breath sounds: Normal breath sounds.   Abdominal:      General: There is no distension.      Palpations: There is no hepatomegaly.      Tenderness: There is no abdominal tenderness.   Musculoskeletal:      Cervical back: Normal range of motion.   Lymphadenopathy:      Cervical: No cervical adenopathy.      Upper Body:      Right upper body: No axillary adenopathy.      Left upper body: No axillary adenopathy.   Neurological:      Cranial Nerves: No cranial nerve deficit.      Sensory: No sensory deficit.      Motor: Motor function is intact.      Deep Tendon Reflexes: Reflexes are normal and symmetric.          Assessment/Plan       Assessment and plan: 1.  Hypertension: Controlled.  Continue Cozaar.  Check CBC  2.  Paresthesia: Schedule neurology appointment.  Schedule carotid ultrasound   3.  He was here for urgent care only a.  He will return to clinic for a physical    Visit today included increased complexity associated with the care of the episodic problem hypertension addressed and managing the longitudinal care of the patient due to the serious and/or complex managed problem(s) hypertension, paresthesia.

## 2025-06-26 ENCOUNTER — TELEPHONE (OUTPATIENT)
Dept: VASCULAR SURGERY | Facility: CLINIC | Age: 70
End: 2025-06-26
Payer: MEDICARE

## 2025-06-26 NOTE — TELEPHONE ENCOUNTER
Spoke to pt. Advised he's scheduled [facility] Told him we can have him do his carotid us in our Vascular Lab on 7/9/25 right before his appt with Dr. Walter. Pt stated he didn't understand why he needed to do his ultrasound at [facility] and not an outside facility. Explained to pt that our lab techs work directly with our surgeons and know specifics to look for. Advised the ultrasound would be read by the surgeon not a radiologist. Pt stated he didn't want to have to pay for two different ultrasounds. He stated Dr. Andersen wanted him to do the carotid us first and then he'd have to do another type of ultrasound to check his subclavian. He mentioned this was for insurance purposes. Advised the referral was placed specifically regarding his carotids. Told him we will contact Dr. Andersen to see if there was something else that he was being referred for, and can speak with him from there. Patient verbalized understanding and had no further questions.

## 2025-07-07 ENCOUNTER — TELEPHONE (OUTPATIENT)
Dept: VASCULAR SURGERY | Facility: CLINIC | Age: 70
End: 2025-07-07
Payer: MEDICARE

## 2025-07-07 DIAGNOSIS — I77.9 CAROTID ARTERY DISEASE, UNSPECIFIED LATERALITY, UNSPECIFIED TYPE: Primary | ICD-10-CM

## 2025-07-07 DIAGNOSIS — I77.1 STRICTURE OF ARTERY: ICD-10-CM

## 2025-07-07 NOTE — TELEPHONE ENCOUNTER
Attempted to contact the pt again in reference to appt scheduled 7/09/25 but no answer. Left a voice message with a call back number [phone].

## 2025-07-09 ENCOUNTER — HOSPITAL ENCOUNTER (OUTPATIENT)
Dept: VASCULAR SURGERY | Facility: CLINIC | Age: 70
Discharge: HOME | End: 2025-07-09
Attending: SURGERY
Payer: MEDICARE

## 2025-07-09 ENCOUNTER — INITIAL CONSULT (OUTPATIENT)
Dept: VASCULAR SURGERY | Facility: CLINIC | Age: 70
End: 2025-07-09
Payer: MEDICARE

## 2025-07-09 VITALS
SYSTOLIC BLOOD PRESSURE: 125 MMHG | DIASTOLIC BLOOD PRESSURE: 80 MMHG | TEMPERATURE: 98 F | BODY MASS INDEX: 25.56 KG/M2 | WEIGHT: 178.56 LBS | HEIGHT: 70 IN

## 2025-07-09 DIAGNOSIS — I73.9 PVD (PERIPHERAL VASCULAR DISEASE): ICD-10-CM

## 2025-07-09 DIAGNOSIS — I77.1 STRICTURE OF ARTERY: ICD-10-CM

## 2025-07-09 DIAGNOSIS — I77.9 CAROTID ARTERY DISEASE, UNSPECIFIED LATERALITY, UNSPECIFIED TYPE: ICD-10-CM

## 2025-07-09 DIAGNOSIS — I65.23 BILATERAL CAROTID ARTERY STENOSIS: Primary | ICD-10-CM

## 2025-07-09 DIAGNOSIS — R42 VERTIGO: ICD-10-CM

## 2025-07-09 PROBLEM — R97.20 ELEVATED PSA: Status: ACTIVE | Noted: 2025-07-09

## 2025-07-09 PROCEDURE — 93880 EXTRACRANIAL BILAT STUDY: CPT | Mod: S$GLB,,, | Performed by: SURGERY

## 2025-07-09 PROCEDURE — 99999 PR PBB SHADOW E&M-EST. PATIENT-LVL III: CPT | Mod: PBBFAC,,, | Performed by: SURGERY

## 2025-07-09 NOTE — PROGRESS NOTES
Vascular Surgery Clinic Note    Referring Provider: Leticia Andersen MD    Reason for Referral: Vertigo    HPI: MR. Pisano is a 68yo  gentleman who has been referred to the Vascular Surgery Clinic for evaluation of vertigo that has been present intermittently over the past two years.  He reports that turning his head to the right improves paresthesia of his right face which is associated with his vertigo.  He denied symptoms suggestive of vertebrobasilar insufficiency or upper extremity heaviness/weakness.    PMH: Hyperlipidemia    PSH: Denied previous vascular surgical intervention.     No known drug allergies    Medications: List reviewed in EMR    Social Hx: Denied tobacco use.  Affirmed social ethanol consumption.    Family Hx: Denied bleeding dyscrasias.    ROS: Denied fever, chills, nausea, emesis, chest pain, dyspnea, abdominal pain, dysuria, rash.    Physical Examination: VS reviewed in EMR; bilateral NISBP 120mm Hg  WN, WD  gentleman, NAD  Alert, answered questions appropriately, followed simple commands  Symmetric chest excursion, normal rate, regular rhythm  Abdomen soft, ND  2+ palpable radial pulses bilaterally  CN II-XII intact, sensorimotor intact throughout    Imaging: Carotid duplex reviewed    Assessment and Plan: Mr. Pisano is a 68yo gentleman with no signs or symptoms suggestive of vertebrobasilar insufficiency.  His symptoms are more suggestive of neurologic etiology.  All of Mr. Pisano's questions were answered.  He was in agreement with the plan.

## 2025-07-11 ENCOUNTER — OFFICE VISIT (OUTPATIENT)
Facility: CLINIC | Age: 70
End: 2025-07-11
Payer: MEDICARE

## 2025-07-11 VITALS
WEIGHT: 178.56 LBS | DIASTOLIC BLOOD PRESSURE: 77 MMHG | HEIGHT: 70 IN | SYSTOLIC BLOOD PRESSURE: 122 MMHG | HEART RATE: 67 BPM | BODY MASS INDEX: 25.56 KG/M2

## 2025-07-11 DIAGNOSIS — R20.2 PARESTHESIA: ICD-10-CM

## 2025-07-11 DIAGNOSIS — R20.2 FACIAL PARESTHESIA: Primary | ICD-10-CM

## 2025-07-11 PROCEDURE — 99999 PR PBB SHADOW E&M-EST. PATIENT-LVL III: CPT | Mod: PBBFAC,,,

## 2025-07-11 NOTE — PROGRESS NOTES
"      [facility] - NEUROLOGY [facility]TH [facility], [place]    Date: 7/11/25  Patient Name: Matthew Pisano   MRN: [id number]   PCP: Leticia Andersen  Referring Provider: Leticia Andersen MD    Reason for visit: "Paresthesia"    Details provided by:    Patient    HISTORY OF PRESENT ILLNESS   Mr. Matthew Pisano is a 69 y.o. male with PMHx of HLD presenting for evaluation of R-sided facial paresethesias.     NEUROLOGICAL SYMPTOMS:  He reports right-sided facial tingling that is also associated with itching. Symptoms initially began in right shoulder approximately 2 years ago, described as a feather-like sensation. These sensations are no longer present in the R shoulder, but they have now traveled to the lower R side of his face around his jaw line and extending into the R side of his neck. These sensations typic ally occur 4-10 times daily, including multiple episodes throughout the night. Each episode lasts approximately 5 minutes. Symptoms are intermittent and can be temporarily relieved by positional changes, such as turning head to the right or applying pressure to the affected area. He describes the sensation as more of a tingling/itching quality rather than numbness, with an urge to scratch. Symptoms are stable, not progressively worsening, and not painful. He denies any headaches. He currently denies any neck pain or pain radiating from his neck, but he did have an MRI Cervical Spine (4/18/24) showed mild spinal canal stenosis and moderate bilateral neural formainal narrowing at multiple levels.    VERTIGO:  He reports chronic dizziness and vertigo symptoms persisting for several years, but previous comprehensive audiological testing over revealed no significant findings.    MEDICAL HISTORY:  History significant for bilateral torn rotator cuffs requiring previous medical intervention.    MEDICATIONS:  He takes a daily B complex supplement and recently started magnesium. He expresses preference for minimal " medication use. He has tried both gabapentin and lyrica in the past, but he did not get significantly benefit from either medication. He states that he took the lyrica only once before stopping since it made him significantly drowsy.    SOCIAL HISTORY:  He consumes approximately one glass of wine per night or a few times per week. Denies history of heavy drinking.      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  No fracture dislocation bone destruction seen.  Odontoid, prevertebral soft tissues, and posterior elements are intact.  No soft tissue injury or whiplash injury is seen.  The craniocervical junction is unremarkable.  C2-C3 demonstrates very mild DJD.  The canal and neural foramina are patent.  C3-C4 demonstrates disc osteophyte complex that flattens anterior thecal sac.  There is mild canal narrowing and bilateral moderate neural foraminal narrowing.  C4-C5 demonstrates disc osteophyte complex that flattens the anterior thecal sac.  There is a right paracentral disc protrusion that abuts the right anterior cord.  The cord is preserved in signal and caliber.  There is mild canal narrowing and there is moderate bilateral neural foraminal narrowing.  C5-C6 demonstrates disc osteophyte complex that flattens the anterior thecal sac.  There is mild canal narrowing.  There is moderate left, mild right neural foraminal narrowing.  There is a focal area of myelomalacia in the right paracentral anterior cord.  C6-C7 demonstrates disc osteophyte complex that lateralizes towards the left and flattens the anterior/left anterolateral thecal sac.  There is moderate bilateral neural foraminal narrowing.  C7-T1 demonstrates mild DJD.  The canal is patent.  There is mild neural foraminal narrowing.        MRI Brain (5/13/15)  No evidence for acute infarction or enhancing lesion.    Pertinent work up based on chart review for current condition:    Lab Results  "  Component Value Date    WBC 6.06 06/06/2025    HGB 16.3 06/06/2025    HCT 49.1 06/06/2025     06/06/2025    CHOL 218 (H) 11/13/2024    TRIG 59 11/13/2024    HDL 68 11/13/2024    ALT 19 11/13/2024    AST 19 11/13/2024     11/13/2024    K 4.3 11/13/2024     11/13/2024    CREATININE 0.9 11/13/2024    BUN 23 11/13/2024    CO2 27 11/13/2024    TSH 1.287 08/12/2019    [id number] 721 04/18/2024       Review of Systems:  12 system review of systems is negative except for the symptoms mentioned in HPI.     PHYSICAL EXAMINATION     Vitals:    07/11/25 1102   BP: 122/77   Patient Position: Sitting   Pulse: 67   Weight: 81 kg (178 lb 9.2 oz)   Height: 5' 10" (1.778 m)     Wt Readings from Last 3 Encounters:   07/11/25 1102 81 kg (178 lb 9.2 oz)   07/09/25 1147 81 kg (178 lb 9.2 oz)   06/06/25 1317 81.5 kg (179 lb 10.8 oz)     Body mass index is 25.62 kg/m².     GENERAL/CONSTITUTIONAL/SYSTEMIC:    -Well appearing; well nourished    HIGHER INTEGRATIVE FUNCTIONS:   -Attention & concentration: Normal   -Orientation: Oriented to person, place & time  -Memory: Normal  -Language: Normal   -Fund of Knowledge: Normal     CRANIAL NERVES:   -CN 2: Visual fields full  -CN 2,3: PERRL  -CN 3,4,6: EOMI  -CN 5: Facial sensation intact bilaterally  -CN 7: Facial strength/movement intact bilaterally  -CN 8: Hearing normal bilaterally  -CN 9,10: Palate elevates symmetrically  -CN 11: Normal shoulder shrug and head turn  -CN 12: Tongue protrudes midline     MOTOR:   -Tone: normal in upper and lower extremities  -UE/LE motor: 5/5 throughout. Full ROM of neck without pain.     SENSATION:   - Light touch intact throughout.  - Pinprick intact throughout.  - Vibratory sense intact throughout.    REFLEXES:     RIGHT Reflex   LEFT   2+ Biceps 2+   2+ Brachiorad. 2+   2+ Triceps 2+    Pectoralis     Jaw Jerk    - Dixon's -        2+ Patellar 2+   2+ Ankle 2+    Suprapatellar              Equivocal PLANTAR Mute       COORDINATION: "   -FNF normal bilaterally  -Romberg neg    GAIT:   -Normal casual. Mild difficulty with tandem gait.    Scheduled Follow-up :  Future Appointments   Date Time Provider Department Center   11/10/2025 11:00 AM Leticia Andersen MD [facility]       After Visit Medication List :     Medication List            Accurate as of July 11, 2025 12:41 PM. If you have any questions, ask your nurse or doctor.                CONTINUE taking these medications      b complex vitamins tablet     ezetimibe 10 mg tablet  Commonly known as: ZETIA  Take 1 tablet (10 mg total) by mouth once daily.     losartan 25 MG tablet  Commonly known as: COZAAR  Take 1 tablet (25 mg total) by mouth once daily.     multivitamin with minerals tablet     sildenafiL 50 MG tablet  Commonly known as: VIAGRA     tadalafiL 5 MG tablet  Commonly known as: CIALIS     testosterone cypionate 200 mg/mL injection  Commonly known as: DEPOTESTOTERONE CYPIONATE     triamcinolone acetonide 0.1% 0.1 % cream  Commonly known as: KENALOG  Apply topically 2 (two) times daily. Use to affected areas for up to 2 weeks then take a 1 week break or decrease to 3 times weekly. Do not apply to groin or face                Assessment/Plan:   Mr. Matthew Pisano is a 69 y.o. male with PMHx of HLD presenting for evaluation of facial paresethesias.     - Patient with R sided facial tingling roughly in a lower V3 distribution with extension into the R side of the neck. Present intermittently for the past 2 years. Not associated with numbness or pain.  - Given that the sensation improves with R rotation of the neck, suspect that the most likely cause is cervical in nature. Although he currently denies neck pain, MRI Cervical Spine (4/18/24) showed mild spinal canal stenosis and moderate bilateral neural formainal narrowing at multiple levels  - Ordered routine neuropathy labs to help r/o any contributing factors  - Patient would like to avoid medication at this time, but  consider starting low-dose cymablta at next OV. He does not wish to try gabapentin or lyrica again.  - Provided patient with neck stretching exercises to do as needed. Although not indicated at this time, consider PT if needed in the future.  - Can also consider EMG/NCV, specifically to test cervical paraspinals    Follow-up in 3 months.    I discussed with the patient in depth the risk/benefits of the following plan and the patient was amenable. We discussed the following:    Problem List Items Addressed This Visit    None  Visit Diagnoses         Facial paresthesia    -  Primary    Relevant Orders    Folate    Hemoglobin A1C    TSH    Vitamin B1    Vitamin B12 Deficiency Panel    Vitamin B6    Heavy Metals Screen, Blood (Quantitative)    Ganglioside Antibody Panel, Serum    Vitamin E    Zinc    Copper, Serum    Phosphatidylethanol (PETH)      Paresthesia                     This evaluation was completed in >50  Minutes over 50% of the time spent on education & counseling. This includes face to face time and non-face to face time preparing to see the patient (eg, review of tests), obtaining and/or reviewing separately obtained history, documenting clinical information in the electronic or other health record, independently interpreting results and communicating results to the patient/family/caregiver, or care coordinator.    This note was generated with the assistance of ambient listening technology. Verbal consent was obtained by the patient and accompanying visitor(s) for the recording of patient appointment to facilitate this note. I attest to having reviewed and edited the generated note for accuracy, though some syntax or spelling errors may persist. Please contact the author of this note for any clarification.          Marshall J Kellerman, PA-C  Supervising physician Sharon Zapien MD was available for all questions during this exam.  [facility]  [facility]. [facility]  [place] [address].

## 2025-07-21 ENCOUNTER — PATIENT MESSAGE (OUTPATIENT)
Dept: ADMINISTRATIVE | Facility: HOSPITAL | Age: 70
End: 2025-07-21
Payer: MEDICARE